# Patient Record
Sex: FEMALE | Race: WHITE | NOT HISPANIC OR LATINO | Employment: OTHER | URBAN - METROPOLITAN AREA
[De-identification: names, ages, dates, MRNs, and addresses within clinical notes are randomized per-mention and may not be internally consistent; named-entity substitution may affect disease eponyms.]

---

## 2021-07-21 ENCOUNTER — RECORDS - HEALTHEAST (OUTPATIENT)
Dept: ADMINISTRATIVE | Facility: CLINIC | Age: 66
End: 2021-07-21

## 2022-02-01 ENCOUNTER — TRANSFERRED RECORDS (OUTPATIENT)
Dept: MULTI SPECIALTY CLINIC | Facility: CLINIC | Age: 67
End: 2022-02-01
Payer: COMMERCIAL

## 2022-05-06 ENCOUNTER — THERAPY VISIT (OUTPATIENT)
Dept: PHYSICAL THERAPY | Facility: CLINIC | Age: 67
End: 2022-05-06
Payer: COMMERCIAL

## 2022-05-06 DIAGNOSIS — M25.551 BILATERAL HIP PAIN: ICD-10-CM

## 2022-05-06 DIAGNOSIS — M25.552 BILATERAL HIP PAIN: ICD-10-CM

## 2022-05-06 PROCEDURE — 97161 PT EVAL LOW COMPLEX 20 MIN: CPT | Mod: GP | Performed by: PHYSICAL THERAPIST

## 2022-05-06 PROCEDURE — 97110 THERAPEUTIC EXERCISES: CPT | Mod: GP | Performed by: PHYSICAL THERAPIST

## 2022-05-06 ASSESSMENT — ACTIVITIES OF DAILY LIVING (ADL)
HOS_ADL_SCORE(%): 60.29
HOW_WOULD_YOU_RATE_YOUR_CURRENT_LEVEL_OF_FUNCTION_DURING_YOUR_USUAL_ACTIVITIES_OF_DAILY_LIVING_FROM_0_TO_100_WITH_100_BEING_YOUR_LEVEL_OF_FUNCTION_PRIOR_TO_YOUR_HIP_PROBLEM_AND_0_BEING_THE_INABILITY_TO_PERFORM_ANY_OF_YOUR_USUAL_DAILY_ACTIVITIES?: 30
WALKING_APPROXIMATELY_10_MINUTES: SLIGHT DIFFICULTY
WALKING_15_MINUTES_OR_GREATER: MODERATE DIFFICULTY
GETTING_INTO_AND_OUT_OF_AN_AVERAGE_CAR: SLIGHT DIFFICULTY
PUTTING_ON_SOCKS_AND_SHOES: SLIGHT DIFFICULTY
WALKING_DOWN_STEEP_HILLS: SLIGHT DIFFICULTY
WALKING_UP_STEEP_HILLS: SLIGHT DIFFICULTY
SITTING_FOR_15_MINUTES: MODERATE DIFFICULTY
GETTING_INTO_AND_OUT_OF_A_BATHTUB: MODERATE DIFFICULTY
TWISTING/PIVOTING_ON_INVOLVED_LEG: EXTREME DIFFICULTY
LIGHT_TO_MODERATE_WORK: MODERATE DIFFICULTY
ROLLING_OVER_IN_BED: SLIGHT DIFFICULTY
HOS_ADL_HIGHEST_POTENTIAL_SCORE: 68
GOING_DOWN_1_FLIGHT_OF_STAIRS: SLIGHT DIFFICULTY
HOS_ADL_COUNT: 17
RECREATIONAL_ACTIVITIES: UNABLE TO DO
DEEP_SQUATTING: SLIGHT DIFFICULTY
HOS_ADL_ITEM_SCORE_TOTAL: 41
HEAVY_WORK: UNABLE TO DO
WALKING_INITIALLY: NO DIFFICULTY AT ALL
STEPPING_UP_AND_DOWN_CURBS: NO DIFFICULTY AT ALL
GOING_UP_1_FLIGHT_OF_STAIRS: SLIGHT DIFFICULTY
STANDING_FOR_15_MINUTES: MODERATE DIFFICULTY

## 2022-05-06 NOTE — PROGRESS NOTES
Physical Therapy Initial Evaluation  Subjective:  The history is provided by the patient. No  was used.   Patient Health History  Celso Cates being seen for Back and Hip Pain.     Problem began: 1/1/2022.   Problem occurred: It is a recurring problem   Pain is reported as 7/10 on pain scale.  General health as reported by patient is good.  Pertinent medical history includes: changes in bowel/bladder, pain at night/rest and unexplained weight loss.     Medical allergies: none.   Surgeries include:  Other. Other surgery history details: Pelvic reconstruction with hysterectomy; 2013.    Current medications:  Anti-depressants and sleep medication.    Current occupation is Professor.   Primary job tasks include:  Computer work and prolonged sitting.                  Therapist Generated HPI Evaluation  Problem details: Pt is self referred to therapy. She reports pain in right > left hip and right low back. History of some low back pain/SI joint pain about 5 years ago, had therapy at the time and was better. In January 2022 had increased pain after yoga class which really focused on end range stretching and holding in that position. She reports a hematoma on right lateral hip area that has been present for years, it doesn't hurt, but wonders if it affects anything regarding her hip pain. Has been told in the past that her SI joint is not stable, and that's what they worked on in therapy.  Normally is physically active, likes yoga, has kayak, likes to walk. With walking can go about 1 mile before onset of symptoms, but does not make it worse,  Not doing yoga now due to increased pain with it, but would like to do again, if able. She recently moved here from [place], so will be seeing primary doctor next week (in [facility]). .         Type of problem:  Lumbar and sacroiliac.    This is a chronic condition.  Condition occurred with:  Insidious onset.  Where condition occurred: during  recreation/sport.  Patient reports pain:  SI joint right and lumbar spine right.  Pain is described as aching and shooting and is intermittent.  Radiates to: right hip pain. Pain is the same all the time.  Since onset symptoms are unchanged.  Symptoms are exacerbated by certain positions, twisting, walking and standing  and relieved by rest and activity/movement.      Work activity restrictions: pt in process of moving and retiring.  Barriers include:  None as reported by patient.                        Objective:  System         Lumbar/SI Evaluation  ROM:      Strength: fair(+) strength core stabilizers  Lumbar Myotomes:    T12-L3 (Hip Flex):  Left: 5    Right: 5  L2-4 (Quads):  Left:  5    Right:  5  L4 (Ankle DF):  Left:  5      L5 (Great Toe Ext): Left: 5    Right: 5   S1 (Toe Raise):  Left: 5    Right: 5        Neural Tension/Mobility:  Lumbar:  Normal        Lumbar Palpation:      Tenderness present at Right: Piriformis and PSIS        SI joint/Sacrum:          Left negative at:    Forward bend standing  Right positive at:    Sacral thrust  Right negative at:  Forward bend standing  Sacral conclusion left:  Posterior inominate  Sacral conclusion right:  Anterior inominate                                  Hip Evaluation  HIP AROM:  AROM:   Left Hip:     Normal    Right Hip:                      Hip Strength:    Flexion:   Left: 5/5   Pain:  Right: 5/5   Pain:                    Extension:  Left: 5-/5  Pain:Right: 4+/5    Pain:    Abduction:  Left: 5-/5     Pain:Right: 5-/5    Pain:  Adduction:  Left: 5/5    Pain:Right: 5/5   Pain:  Internal Rotation:  Left: 5/5    Pain:Right: 5/5   Pain:  External Rotation:  Left: 5/5   Pain:  Right: 5-/5   Pain:  Knee Flexion:  Left: 5/5   Pain:Right: 5/5   Pain:  Knee Extension:  Left: 5/5   Pain:Right: 5/5    Pain:        Hip Special Testing:      Left hip negative for the following special tests:  Hiro; Fadir/Labrum or SLR  Right hip negative for the following special  tests:  Hiro; Fadir/Labrum or SLR    Hip Palpation:      Right hip tenderness present at:  PSIS and Iliac Crest  Functional Testing:          Quad:      Bilateral leg squat:  Mild loss of control and fermoral IR                    Ernestina Lumbar Evaluation    Posture:  Sitting: good  Standing: good  Lordosis: WNL  Lateral Shift: no      Movement Loss:  Flexion (Flex): nil  Extension (EXT): min  Side Glide R (SG R): nil  Side Glide L (SG L): nil  Test Movements:  FIS: During: no effect  After: no effect  Pretest Movements: 0/10   Repeat FIS: During: increases  After: no worse    EIS: During: no effect  After: no effect    Repeat EIS: During: no effect  After: no effect    COLT: During: no effect  After: no effect    Repeat COLT: During: produces  After: no worse    EIL: During: no effect  After: no effect    Repeat EIL: During: no effect  After: no effect            Principle of Treatment:          Other: some pain right PSIS and SI joint region with repeated flexion in standing and lying, but not increased pain after. end range tightness with extension, but no reproduction symptoms.                                       ROS    Assessment/Plan:    Patient is a 66 year old female with lumbar, sacral and both sides hip complaints.    Patient has the following significant findings with corresponding treatment plan.                Diagnosis 1:  Hip pain  Pain -  hot/cold therapy, manual therapy, directional preference exercise and home program  Decreased ROM/flexibility - manual therapy, therapeutic exercise and home program  Decreased joint mobility - manual therapy, therapeutic exercise and home program  Decreased strength - therapeutic exercise, therapeutic activities and home program    Therapy Evaluation Codes:   1) Clinical presentation characteristics are:   Stable/Uncomplicated.  2) Decision-Making    Low complexity using standardized patient assessment instrument and/or measureable assessment of functional  outcome.  Cumulative Therapy Evaluation is: Low complexity.    Previous and current functional limitations:  (See Goal Flow Sheet for this information)    Short term and Long term goals: (See Goal Flow Sheet for this information)     Communication ability:  Patient appears to be able to clearly communicate and understand verbal and written communication and follow directions correctly.  Treatment Explanation - The following has been discussed with the patient:   RX ordered/plan of care  Anticipated outcomes  Possible risks and side effects  This patient would benefit from PT intervention to resume normal activities.   Rehab potential is good.    Frequency:  1 X week, once daily  Duration:  for 8 weeks  Discharge Plan:  Achieve all LTG.  Independent in home treatment program.  Reach maximal therapeutic benefit.    Please refer to the daily flowsheet for treatment today, total treatment time and time spent performing 1:1 timed codes.

## 2022-05-06 NOTE — PROGRESS NOTES
Celso is a 66 year old who is being evaluated via a billable video visit.      How would you like to obtain your AVS? MyChart  If the video visit is dropped, the invitation should be resent by: Text to cell phone: [phone]  Will anyone else be joining your video visit? No      Video Start Time: 4:24 PM    Assessment & Plan   66 year old female, moved from  [place] 2 weeks ago  Appointment today to establish as new patient  And also to discuss one sided headache and concerns about sinus infection.  Nonintractable migraine, unspecified migraine type  DDX include cluster.mi headache, sinus headache, benign tumer, infection    Clinically the cause of one sided headache , behind left eye with some sinus congestion/ rhinorrhea on the same size - - seem consistent with cluster vs migraine type cluster headache    . Advised adequate hydration, and sleep  Take as needed  imitrex   - SUMAtriptan (IMITREX) 50 MG tablet; Take 1 tablet (50 mg) by mouth at onset of headache for migraine May repeat in 2 hours. Max 4 tablets/24 hours.  - melatonin 3 MG tablet; actually 1 mg bedtime to help prevent frquet headache Take 1 tablet (3 mg) by mouth nightly as needed for sleep  Potential medication side effects were discussed with the patient; let me know if any occur.      Make headache diary  Consider prophylactic medications like verapamil in 4 weeks follow up    Follow up earlier as needed  If questions about frequent headache or new concerns    Sinus headache  She is requesting ENT referral and is provider  - Otolaryngology Referral; Future    Encounter for screening mammogram for malignant neoplasm of breast  Advised to complete mammogram  - *MA Screening Digital Bilateral; Future    Colon cancer screening  Colonoscopy Up to date - [facility] [place]-  Feb 2022- repeat due in 10 yrs    Known history of anxiety and depression  - considered this problem when making today's plans  - no interventions today       -followed by  specialist.      Prescription drug management  44 minutes spent on the date of the encounter doing chart review, history and exam, documentation and further activities per the note      Return in about 4 weeks (around 2022) for concerns,unresolved, routine physical.    Spring Fletcher MD  [facility]DALTON Houston is a 66 year old who presents for the following health issues     History of Present Illness       Back Pain:  She presents for follow up of back pain. Patient's back pain is a recurring problem.  Location of back pain:  Right lower back, right middle of back, left middle of back and right hip  Description of back pain: burning and gnawing  Back pain spreads: right buttocks    Since patient first noticed back pain, pain is: always present, but gets better and worse  Does back pain interfere with her job:  Yes      She eats 4 or more servings of fruits and vegetables daily.She consumes 0 sweetened beverage(s) daily.She exercises with enough effort to increase her heart rate 20 to 29 minutes per day.  She exercises with enough effort to increase her heart rate 7 days per week.   She is taking medications regularly.   started PHYSICAL THERAPY and feels confident iwill keep improving        Moved from   [place] to [facility]  Hoping to get on medicare & will have appointment comming up    Left eye pain-since 2021  tootie after long zoom meeting.  Seen ophthalmology- no eye problem & is advised to see ENT- hoping to get referral    Have sinus headache- april  Left sinus congestion  Can not take allergy medications - respond badly  Even saline drops     Also left sided headache - behind left eye ball- feels infection in the sinus at that's time- headache , moderate last up to 2 hrs, improves on own.    Anxiety - under care of   remeron is helped  Retiring , dad , COVID  Also seeing a thera[pist      Review of Systems  knwn history of anxiety- and see  provider.  Orally  Remeron  lunesta bedtime to help sleep.  Moved to [facility] only 2 weeks ago and wants to establish new care as wel.    Constitutional, HEENT, cardiovascular, pulmonary, GI, , musculoskeletal, neuro, skin, endocrine and psych systems are negative, except as otherwise noted.      Objective           Vitals:  No vitals were obtained today due to virtual visit.    Physical Exam   GENERAL: Healthy, alert and no distress  EYES: Eyes grossly normal to inspection.  No discharge or erythema, or obvious scleral/conjunctival abnormalities.  RESP: No audible wheeze, cough, or visible cyanosis.  No visible retractions or increased work of breathing.    SKIN: Visible skin clear. No significant rash, abnormal pigmentation or lesions.  NEURO: Cranial nerves grossly intact.  Mentation and speech appropriate for age.  PSYCH: Mentation appears normal, affect normal/bright, judgement and insight intact, normal speech and appearance well-groomed.                Video-Visit Details    Type of service:  Video Visit    Video End Time:4:46 PM    Originating Location (pt. Location): Home    Distant Location (provider location):  [facility]     Platform used for Video Visit: [employer]

## 2022-05-09 ENCOUNTER — VIRTUAL VISIT (OUTPATIENT)
Dept: FAMILY MEDICINE | Facility: CLINIC | Age: 67
End: 2022-05-09
Payer: COMMERCIAL

## 2022-05-09 DIAGNOSIS — Z12.11 COLON CANCER SCREENING: ICD-10-CM

## 2022-05-09 DIAGNOSIS — Z12.31 ENCOUNTER FOR SCREENING MAMMOGRAM FOR MALIGNANT NEOPLASM OF BREAST: ICD-10-CM

## 2022-05-09 DIAGNOSIS — G43.009 NONINTRACTABLE MIGRAINE, UNSPECIFIED MIGRAINE TYPE: Primary | ICD-10-CM

## 2022-05-09 DIAGNOSIS — F41.9 ANXIETY DISORDER, UNSPECIFIED TYPE: ICD-10-CM

## 2022-05-09 DIAGNOSIS — R51.9 SINUS HEADACHE: ICD-10-CM

## 2022-05-09 PROCEDURE — 99203 OFFICE O/P NEW LOW 30 MIN: CPT | Mod: 95 | Performed by: FAMILY MEDICINE

## 2022-05-09 RX ORDER — SUMATRIPTAN 50 MG/1
50 TABLET, FILM COATED ORAL
Qty: 30 TABLET | Refills: 3 | Status: SHIPPED | OUTPATIENT
Start: 2022-05-09 | End: 2022-06-09

## 2022-05-09 RX ORDER — LANOLIN ALCOHOL/MO/W.PET/CERES
3 CREAM (GRAM) TOPICAL
Qty: 30 TABLET | Refills: 0 | COMMUNITY
Start: 2022-05-09 | End: 2022-06-09

## 2022-05-12 NOTE — TELEPHONE ENCOUNTER
FUTURE VISIT INFORMATION      FUTURE VISIT INFORMATION:    Date: 6/24/22    Time: 3PM    Location: [facility]  REFERRAL INFORMATION:    Referring provider:  Spring Fletcher MD    Referring providers clinic:  [facility]    Reason for visit/diagnosis  Per Pt, dx sinus headache, referral from PCP, recs in epic    RECORDS REQUESTED FROM:       Clinic name Comments Records Status Imaging Status   [facility] 5/9/22 note and referral from Spring Fletcher MD Epic

## 2022-05-17 ENCOUNTER — THERAPY VISIT (OUTPATIENT)
Dept: PHYSICAL THERAPY | Facility: CLINIC | Age: 67
End: 2022-05-17
Payer: COMMERCIAL

## 2022-05-17 DIAGNOSIS — M25.552 BILATERAL HIP PAIN: Primary | ICD-10-CM

## 2022-05-17 DIAGNOSIS — M25.551 BILATERAL HIP PAIN: Primary | ICD-10-CM

## 2022-05-17 PROCEDURE — 97140 MANUAL THERAPY 1/> REGIONS: CPT | Mod: GP | Performed by: PHYSICAL THERAPIST

## 2022-05-17 PROCEDURE — 97110 THERAPEUTIC EXERCISES: CPT | Mod: GP | Performed by: PHYSICAL THERAPIST

## 2022-05-18 NOTE — TELEPHONE ENCOUNTER
FUTURE VISIT INFORMATION      FUTURE VISIT INFORMATION:    Date: 6/14/22    Time: 10:00am    [place] [facility]  REFERRAL INFORMATION:    Referring providers clinic:  [facility]    Reason for visit/diagnosis  Stye Issues    RECORDS REQUESTED FROM:       No recs to collect per pt

## 2022-05-19 ENCOUNTER — MEDICAL CORRESPONDENCE (OUTPATIENT)
Dept: ULTRASOUND IMAGING | Facility: CLINIC | Age: 67
End: 2022-05-19
Payer: COMMERCIAL

## 2022-05-26 ENCOUNTER — THERAPY VISIT (OUTPATIENT)
Dept: PHYSICAL THERAPY | Facility: CLINIC | Age: 67
End: 2022-05-26
Payer: COMMERCIAL

## 2022-05-26 DIAGNOSIS — M25.551 BILATERAL HIP PAIN: Primary | ICD-10-CM

## 2022-05-26 DIAGNOSIS — M25.552 BILATERAL HIP PAIN: Primary | ICD-10-CM

## 2022-05-26 PROCEDURE — 97530 THERAPEUTIC ACTIVITIES: CPT | Mod: GP | Performed by: PHYSICAL THERAPIST

## 2022-05-26 PROCEDURE — 97140 MANUAL THERAPY 1/> REGIONS: CPT | Mod: GP | Performed by: PHYSICAL THERAPIST

## 2022-05-31 ENCOUNTER — ANCILLARY PROCEDURE (OUTPATIENT)
Dept: MAMMOGRAPHY | Facility: CLINIC | Age: 67
End: 2022-05-31
Attending: FAMILY MEDICINE
Payer: COMMERCIAL

## 2022-05-31 ENCOUNTER — THERAPY VISIT (OUTPATIENT)
Dept: PHYSICAL THERAPY | Facility: CLINIC | Age: 67
End: 2022-05-31
Payer: COMMERCIAL

## 2022-05-31 DIAGNOSIS — M25.551 BILATERAL HIP PAIN: Primary | ICD-10-CM

## 2022-05-31 DIAGNOSIS — Z12.31 ENCOUNTER FOR SCREENING MAMMOGRAM FOR MALIGNANT NEOPLASM OF BREAST: ICD-10-CM

## 2022-05-31 DIAGNOSIS — M25.552 BILATERAL HIP PAIN: Primary | ICD-10-CM

## 2022-05-31 PROCEDURE — 97140 MANUAL THERAPY 1/> REGIONS: CPT | Mod: GP | Performed by: PHYSICAL THERAPIST

## 2022-05-31 PROCEDURE — 97110 THERAPEUTIC EXERCISES: CPT | Mod: GP | Performed by: PHYSICAL THERAPIST

## 2022-05-31 PROCEDURE — 77063 BREAST TOMOSYNTHESIS BI: CPT

## 2022-05-31 PROCEDURE — 77067 SCR MAMMO BI INCL CAD: CPT

## 2022-06-07 ENCOUNTER — TELEPHONE (OUTPATIENT)
Dept: FAMILY MEDICINE | Facility: CLINIC | Age: 67
End: 2022-06-07

## 2022-06-07 ENCOUNTER — THERAPY VISIT (OUTPATIENT)
Dept: PHYSICAL THERAPY | Facility: CLINIC | Age: 67
End: 2022-06-07
Payer: COMMERCIAL

## 2022-06-07 DIAGNOSIS — M25.551 BILATERAL HIP PAIN: Primary | ICD-10-CM

## 2022-06-07 DIAGNOSIS — M25.552 BILATERAL HIP PAIN: Primary | ICD-10-CM

## 2022-06-07 PROCEDURE — 97110 THERAPEUTIC EXERCISES: CPT | Mod: GP

## 2022-06-07 NOTE — TELEPHONE ENCOUNTER
A.S    Pt seeing a new provider for PT but needs a referral since this provider is not certified to self referred patients.    T'd up order.    FYI: Pt does have an appt to see you today.     Please approve if appropriate  Fatimah Wyman RN

## 2022-06-07 NOTE — TELEPHONE ENCOUNTER
1. Bilateral hip pain  - Physical Therapy Referral; Future    Patient missed appointment with me today.  Referral is signed. Thanks n             Self

## 2022-06-08 NOTE — TELEPHONE ENCOUNTER
Noted, PT clinic said they didn't need a call back. Will look for referral in Epic.    Thank you,  Fatimah Wyman RN  [place]

## 2022-06-09 ENCOUNTER — OFFICE VISIT (OUTPATIENT)
Dept: URGENT CARE | Facility: URGENT CARE | Age: 67
End: 2022-06-09
Payer: COMMERCIAL

## 2022-06-09 ENCOUNTER — ANCILLARY PROCEDURE (OUTPATIENT)
Dept: ULTRASOUND IMAGING | Facility: CLINIC | Age: 67
End: 2022-06-09
Attending: INTERNAL MEDICINE
Payer: COMMERCIAL

## 2022-06-09 VITALS
HEART RATE: 81 BPM | OXYGEN SATURATION: 96 % | DIASTOLIC BLOOD PRESSURE: 76 MMHG | SYSTOLIC BLOOD PRESSURE: 135 MMHG | TEMPERATURE: 97.3 F | HEIGHT: 63 IN | WEIGHT: 115 LBS | RESPIRATION RATE: 16 BRPM | BODY MASS INDEX: 20.38 KG/M2

## 2022-06-09 DIAGNOSIS — M77.8 THUMB TENDONITIS: Primary | ICD-10-CM

## 2022-06-09 DIAGNOSIS — R10.13 DYSPEPSIA: ICD-10-CM

## 2022-06-09 PROCEDURE — 76700 US EXAM ABDOM COMPLETE: CPT | Mod: GC | Performed by: STUDENT IN AN ORGANIZED HEALTH CARE EDUCATION/TRAINING PROGRAM

## 2022-06-09 PROCEDURE — 99203 OFFICE O/P NEW LOW 30 MIN: CPT | Performed by: PHYSICIAN ASSISTANT

## 2022-06-09 ASSESSMENT — ENCOUNTER SYMPTOMS
CONSTITUTIONAL NEGATIVE: 1
MYALGIAS: 1
NEUROLOGICAL NEGATIVE: 1
ARTHRALGIAS: 1
PSYCHIATRIC NEGATIVE: 1

## 2022-06-09 NOTE — PROGRESS NOTES
"Assessment & Plan     Thumb tendonitis  Tylenol up to 1000 mg three times a day as needed, Lidocaine patch/cream/gel, Hot soaks with epsom salt. She will buddy tape her fingers at this time and discussed different splints to use.     Return in about 1 week (around 6/16/2022), or if symptoms worsen or fail to improve.    Subjective     Celso is a 66 year old female who presents to clinic today with for the following health issues:  Chief Complaint   Patient presents with     Urgent Care     Trauma     Rt thumb pain since yesterday. Pt being using thumb to message hip.     Celso presents with right thumb pain x 2 days. She reports she uses her thumb as a trigger point to massage her SI joint. She denies numbness, tingling or weakness. Pain is in the thenar eminence. She has not tried any medication. She has tried to wrap her thumb.           Review of Systems   Constitutional: Negative.    Musculoskeletal: Positive for arthralgias and myalgias.   Skin: Negative.    Neurological: Negative.    Psychiatric/Behavioral: Negative.            Objective    /76   Pulse 81   Temp 97.3  F (36.3  C) (Temporal)   Resp 16   Ht 1.6 m (5' 3\")   Wt 52.2 kg (115 lb)   SpO2 96%   BMI 20.37 kg/m    Physical Exam  Constitutional:       Appearance: Normal appearance.   HENT:      Head: Normocephalic and atraumatic.   Musculoskeletal:         General: No swelling. Normal range of motion.      Cervical back: Normal range of motion and neck supple.      Comments: Tenderness of right thenar eminence.   (-) Finklestein's   Skin:     General: Skin is warm and dry.   Neurological:      Mental Status: She is alert.              Ian Chun PA-C    "

## 2022-06-09 NOTE — PATIENT INSTRUCTIONS
https://www.amazon.com/Reversible-Stabilizer-BlackBerry-Tendonitis-Lightweight/dp/P67KNCMJOQ/ref=asc_df_B07SWTLTSD/?tag=hyprod-20&linkCode=df0&fszrji=[email or web address]&hvpos=&hvnetw=g&ctygmi=[email or web address]&hvpone=&hvptwo=&hvqmt=&hvdev=c&hvdvcmdl=&hvlocint=&yvlyywck=[id number]&hvtargid=[id number]&psc=1&tag=&ref=&hogzrjm=[id number]&hvpone=&hvptwo=&uelaqs=[id number]&hvpos=&hvnetw=g&dqjdon=[id number]&hvqmt=&hvdev=c&hvdvcmdl=&hvlocint=&fctutnyv=[id number]&hvtargid=[id number]    Tylenol up to 1000 mg three times a day as needed, Lidocaine patch/cream/gel, Hot soaks with epsom salt

## 2022-06-11 ENCOUNTER — HEALTH MAINTENANCE LETTER (OUTPATIENT)
Age: 67
End: 2022-06-11

## 2022-06-13 NOTE — PROGRESS NOTES
[facility]    OUTPATIENT Physical Therapy ORTHOPEDIC EVALUATION  PLAN OF TREATMENT FOR OUTPATIENT REHABILITATION  (COMPLETE FOR INITIAL CLAIMS ONLY)  Patient's Last Name, First Name, M.I.  YOB: 1955  Celso Cates       Provider s Name:  [facility]   Medical Record No.  [id number]   Start of Care Date:  05/06/22   Onset Date:   01/01/22   Type:     _X__PT   ___OT Medical Diagnosis:    Encounter Diagnosis   Name Primary?    Bilateral hip pain         Treatment Diagnosis:  bilateral hip pain        Goals:     05/06/22 0500   Body Part   Goals listed below are for Hip pain   Goal #1   Goal #1 ambulation   Previous Functional Level No restrictions   Current Functional Level Minutes patient can walk   Performance Level 10 minutes; pain 7/10   STG Target Performance Minutes patient will be able to walk   Performance Level 15 min 3/10 pain   Rationale for safe household ambulation;for safe outdoor household ambulation;for safe community ambulation;to maintain proper body mechanics/posture while ambulating to avoid additional compensatory injury due to improper gait mechanics;to promote a healthy and active lifestyle   Due Date 06/03/22    LTG Target Performance Minutes patient will be able to  walk   Performance Level 30 minutes 1/10 pain   Rationale for safe household ambulation;for safe outdoor household ambulation;for safe community ambulation;to maintain proper body mechanics/posture while ambulating to avoid additional compensatory injury due to improper gait mechanics;to promote a healthy and active lifestyle   Due Date 07/01/22       Therapy Frequency:  1x/week  Predicted Duration of Therapy Intervention:  8 weeks    Suzan Bernstein, PT                 I CERTIFY THE NEED FOR THESE SERVICES FURNISHED UNDER        THIS PLAN OF TREATMENT AND WHILE UNDER MY CARE      (Physician attestation of this document indicates review and certification of the therapy plan).                     Certification Date From:  05/06/22   Certification Date To:  07/01/22    Referring Provider:  F=Referred Self    Initial Assessment        See Epic Evaluation SOC Date: 05/06/22

## 2022-06-14 ENCOUNTER — TELEPHONE (OUTPATIENT)
Dept: FAMILY MEDICINE | Facility: CLINIC | Age: 67
End: 2022-06-14

## 2022-06-14 ENCOUNTER — OFFICE VISIT (OUTPATIENT)
Dept: OPHTHALMOLOGY | Facility: CLINIC | Age: 67
End: 2022-06-14
Payer: COMMERCIAL

## 2022-06-14 ENCOUNTER — PRE VISIT (OUTPATIENT)
Dept: OPHTHALMOLOGY | Facility: CLINIC | Age: 67
End: 2022-06-14
Payer: COMMERCIAL

## 2022-06-14 DIAGNOSIS — H00.14 CHALAZION OF LEFT UPPER EYELID: Primary | ICD-10-CM

## 2022-06-14 DIAGNOSIS — H02.889 MEIBOMIAN GLAND DYSFUNCTION: ICD-10-CM

## 2022-06-14 DIAGNOSIS — H01.02A SQUAMOUS BLEPHARITIS OF UPPER AND LOWER EYELIDS OF BOTH EYES: ICD-10-CM

## 2022-06-14 DIAGNOSIS — H01.02B SQUAMOUS BLEPHARITIS OF UPPER AND LOWER EYELIDS OF BOTH EYES: ICD-10-CM

## 2022-06-14 PROCEDURE — 99203 OFFICE O/P NEW LOW 30 MIN: CPT | Mod: GC | Performed by: OPHTHALMOLOGY

## 2022-06-14 ASSESSMENT — EXTERNAL EXAM - LEFT EYE: OS_EXAM: NORMAL

## 2022-06-14 ASSESSMENT — EXTERNAL EXAM - RIGHT EYE: OD_EXAM: NORMAL

## 2022-06-14 ASSESSMENT — SLIT LAMP EXAM - LIDS: COMMENTS: MGD, BLEPHARITIS

## 2022-06-14 ASSESSMENT — TONOMETRY
OS_IOP_MMHG: 19
OD_IOP_MMHG: 19
IOP_METHOD: ICARE

## 2022-06-14 ASSESSMENT — CONF VISUAL FIELD
OS_NORMAL: 1
OD_NORMAL: 1
METHOD: COUNTING FINGERS

## 2022-06-14 ASSESSMENT — VISUAL ACUITY
OS_CC: 20/20
METHOD: SNELLEN - LINEAR
OD_CC+: -2
OD_CC: 20/20
CORRECTION_TYPE: GLASSES
OS_CC+: -2

## 2022-06-14 NOTE — TELEPHONE ENCOUNTER
Reason for Call:  Form, our goal is to have forms completed with 72 hours, however, some forms may require a visit or additional information.    Type of letter, form or note:    Plan of treatment for outpatient PT rehabilitation    Therapy frequency: 1x/week  Predicted duration of therapy intervention: 8 weeks    Certification date from: 05/06/22  Certification date to: 07/01/22      Who is the form from?:   [facility]     Where did the form come from: form was faxed in    What clinic location was the form placed at?:   [facility]    Where the form was placed:   Dr. Fletcher's box    What number is listed as a contact on the form?:   Fax: [phone]       Additional comments: none    Call taken on 6/14/2022 at 10:34 AM by Dione Abarca

## 2022-06-14 NOTE — NURSING NOTE
Chief Complaints and History of Present Illnesses   Patient presents with     Eyelid Cyst Evaluation     Chief Complaint(s) and History of Present Illness(es)     Eyelid Cyst Evaluation     Laterality: left upper lid    Associated symptoms: Negative for blurred vision, discharge, mattering, tearing and eye pain    Treatments tried: artificial tears and warm compresses              Comments     Patient states stye that was present for about 1 months MONTSE. Patient states maybe some sensitivity to erythromycin.  Patient states the stye was drained. Patient states the area is still itchy sometimes and would like to know how to avoid styes in the future. Patient still performs warm compresses each eye daily. Patient states dry eyes each eye.       Eduarda Martinez, EZEQUIEL June 14, 2022 9:52 AM

## 2022-06-14 NOTE — PROGRESS NOTES
[facility] Progress Note    Patient: Celso Cates MRN# [id number]   YOB: 1955 Age: 66 year old   Date of Visit: Jun 14, 2022    CC: Patient presents with:  Eyelid Cyst Evaluation           HPI:     Celso Cates is a 66 year old female with POHx of left retinal tear s/p retinopexy in [place], chalazia for last 1.5 years with 1 s/p I&D in [place]. Here for opinion on how to prevent recurrence.     Interval Jun 14, 2022:  Chief Complaint(s) and History of Present Illness(es)     Eyelid Cyst Evaluation     Laterality: left upper lid    Associated symptoms: Negative for blurred vision, discharge, mattering,   tearing and eye pain    Treatments tried: artificial tears and warm compresses              Comments     Patient states stye that was present for about 1 months MONTSE. Patient   states maybe some sensitivity to erythromycin.  Patient states the stye   was drained. Patient states the area is still itchy sometimes and would   like to know how to avoid styes in the future. Patient still performs warm   compresses each eye daily. Patient states dry eyes each eye.       EZEQUIEL Moore June 14, 2022 9:52 AM                 Assessment & Plan:   Celso Cates is a 66 year old female with the following diagnoses:   Encounter Diagnoses   Name Primary?     Chalazion of left upper eyelid Yes     Squamous blepharitis of upper and lower eyelids of both eyes      Meibomian gland dysfunction    - 1x chalazion s/p I&D in [place]  - No recurrence; pt just wants opinion on how to prevent recurrence  Plan  - Emphasized WCs, LH  - Can trial doxycycline as she is on ocular rosacea spectrum and treating blepharitis will help. She prefers not to do that for now.          Patient disposition: Return for next available complete eye exam with optometry.     My privilege to be part of your care,  Donovan Lipscomb MD, MSc  Ophthalmology PGY-2 resident physician  Pager: [phone]  Agree with  above, mild residual blepharitis. Lid hygiene discussed.  History of retinal tear lasered in [place].  Would like to establish primary eye care here.      Attending Physician Attestation: Complete documentation of historical and exam elements from today's encounter can be found in the full encounter summary report (not reduplicated in this progress note). I personally obtained the chief complaint(s) and history of present illness. I confirmed and edited as necessary the review of systems, past medical/surgical history, family history, social history, and examination findings as documented by others; and I examined the patient myself. I personally reviewed the relevant tests, images, and reports as documented above. I formulated and edited as necessary the assessment and plan and discussed the findings and management plan with the patient.  -Gene Talbert MD

## 2022-06-23 NOTE — PROGRESS NOTES
"SUBJECTIVE:   Celso Cates is a 66 year old female who presents for Preventive Visit.      Patient has been advised of split billing requirements and indicates understanding: Yes  Are you in the first 12 months of your Medicare coverage?  Yes,  Visual Acuity:  Right Eye: 20/40   Left Eye: 20/25  Both Eyes: 20/25    Healthy Habits:     In general, how would you rate your overall health?  Fair    Frequency of exercise:  6-7 days/week    Duration of exercise:  15-30 minutes    Do you usually eat at least 4 servings of fruit and vegetables a day, include whole grains    & fiber and avoid regularly eating high fat or \"junk\" foods?  Yes    Taking medications regularly:  Yes    Medication side effects:  Not applicable    Ability to successfully perform activities of daily living:  Housework requires assistance    Home Safety:  Lack of grab bars in the bathroom    Hearing Impairment:  No hearing concerns    In the past 6 months, have you been bothered by leaking of urine?  No    In general, how would you rate your overall mental or emotional health?  Fair      PHQ-2 Total Score: 2    Additional concerns today:  No  Digestive problems start since March 2022  She suffers from  Early  early satiety , worried about wt loss and on & Off abdomen pain    [facility]- on going treatment .Plans to complete endoscopy 6/28/22  She is worried about nutrition  & weight loss .  Wt Readings from Last 5 Encounters:   06/27/22 50 kg (110 lb 4.8 oz)   06/09/22 52.2 kg (115 lb)   05/07/15 57.1 kg (125 lb 12.8 oz)   04/15/14 55.5 kg (122 lb 6.4 oz)   09/26/13 55.9 kg (123 lb 3.2 oz)       Has seen eye specialist  No concerns with migraine or headache     Has right  handed, works on computer , with right thumb  aching on and off.  Has not needed pain medications  Wondering about PHYSICAL THERAPY       Strong personal and family history of anxiety. krystina has anxiety.  Is  Under care of Dr Regan Covington for 20 yrs      She " has appointment today with dietician- at [facility]- needs referral    Do you feel safe in your environment? Yes    Have you ever done Advance Care Planning? (For example, a Health Directive, POLST, or a discussion with a medical provider or your loved ones about your wishes): No, advance care planning information given to patient to review.  Patient declined advance care planning discussion at this time.       Fall risk  Fallen 2 or more times in the past year?: No  Any fall with injury in the past year?: No    Cognitive Screening   1) Repeat 3 items (Leader, Season, Table)    2) Clock draw: ABNORMAL hands wrong and missing number  3) 3 item recall: Recalls 3 objects  Results: Abnormal clock, all 3 words recalled    Mini-CogTM Copyright [employer]. Licensed by the author for use in [facility] [employer]; reprinted with permission (matthew@[facility]). All rights reserved.      Do you have sleep apnea, excessive snoring or daytime drowsiness?: no    Reviewed and updated as needed this visit by clinical staff   Tobacco  Allergies  Meds  Problems  Med Hx  Surg Hx  Fam Hx  Soc   Hx          Reviewed and updated as needed this visit by Provider   Tobacco  Allergies  Meds  Problems  Med Hx  Surg Hx  Fam Hx           Social History     Tobacco Use     Smoking status: Never Smoker     Smokeless tobacco: Never Used   Substance Use Topics     Alcohol use: Yes     Alcohol/week: 2.5 - 4.2 standard drinks     Types: 3 - 5 drink(s) per week     If you drink alcohol do you typically have >3 drinks per day or >7 drinks per week? No    No flowsheet data found.            Current providers sharing in care for this patient include:   Patient Care Team:  Spring Fletcher MD as PCP - General (Family Medicine)  Spring Fletcher MD as Assigned PCP  Gene Talbert MD as MD (Ophthalmology)  Gene Talbert MD as Assigned Surgical Provider    The following health maintenance items are reviewed in Epic and correct as of  today:  Health Maintenance Due   Topic Date Due     DEXA  Never done     HIV SCREENING  Never done     HEPATITIS C SCREENING  Never done     ZOSTER IMMUNIZATION (1 of 2) Never done     LIPID  04/06/2015     BP Readings from Last 3 Encounters:   06/27/22 118/67   06/09/22 135/76   05/07/15 90/60    Wt Readings from Last 3 Encounters:   06/27/22 50 kg (110 lb 4.8 oz)   06/09/22 52.2 kg (115 lb)   05/07/15 57.1 kg (125 lb 12.8 oz)                    Any new diagnosis of family breast, ovarian, or bowel cancer?     FHS-7:   Breast CA Risk Assessment (FHS-7) 5/31/2022 6/24/2022   Did any of your first-degree relatives have breast or ovarian cancer? Yes Yes   Did any of your relatives have bilateral breast cancer? No No   Did any man in your family have breast cancer? No No   Did any woman in your family have breast and ovarian cancer? No No   Did any woman in your family have breast cancer before age 50 y? No No   Do you have 2 or more relatives with breast and/or ovarian cancer? Yes No   Do you have 2 or more relatives with breast and/or bowel cancer? Yes No     click delete button to remove this line now    Pertinent mammograms are reviewed under the imaging tab.    Review of Systems   Constitutional: Negative for chills and fever.   HENT: Negative for congestion, ear pain, hearing loss and sore throat.    Eyes: Negative for pain and visual disturbance.   Respiratory: Negative for cough and shortness of breath.    Cardiovascular: Negative for chest pain, palpitations and peripheral edema.   Gastrointestinal: Positive for abdominal pain, constipation and heartburn. Negative for diarrhea, hematochezia and nausea.   Breasts:  Negative for tenderness, breast mass and discharge.   Genitourinary: Positive for pelvic pain and urgency. Negative for dysuria, frequency, genital sores, hematuria, vaginal bleeding and vaginal discharge.   Musculoskeletal: Positive for arthralgias and myalgias. Negative for joint swelling.   Skin:  "Positive for rash.   Neurological: Negative for dizziness, weakness, headaches and paresthesias.   Psychiatric/Behavioral: Positive for mood changes. The patient is nervous/anxious.      Constitutional, HEENT, cardiovascular, pulmonary, GI, , musculoskeletal, neuro, skin, endocrine and psych systems are negative, except as otherwise noted.    OBJECTIVE:   /67   Pulse 80   Temp 97.5  F (36.4  C) (Temporal)   Resp 16   Ht 1.595 m (5' 2.8\")   Wt 50 kg (110 lb 4.8 oz)   LMP  (LMP Unknown)   SpO2 97%   BMI 19.67 kg/m   Estimated body mass index is 19.67 kg/m  as calculated from the following:    Height as of this encounter: 1.595 m (5' 2.8\").    Weight as of this encounter: 50 kg (110 lb 4.8 oz).  Physical Exam  GENERAL: healthy, alert and no distress  EYES: Eyes grossly normal to inspection, PERRL and conjunctivae and sclerae normal  HENT: ear canals and TM's normal, nose and mouth without ulcers or lesions  NECK: no adenopathy, no asymmetry, masses, or scars and thyroid normal to palpation  RESP: lungs clear to auscultation - no rales, rhonchi or wheezes  BREAST: normal without masses, tenderness or nipple discharge and no palpable axillary masses or adenopathy  CV: regular rate and rhythm, normal S1 S2, no S3 or S4, no murmur, click or rub, no peripheral edema and peripheral pulses strong  ABDOMEN: soft, nontender, no hepatosplenomegaly, no masses and bowel sounds normal  Musculoskeletal Exam:Right Wrist: Local tenderness over the distal portion of the radial styloid, adjacent to the abductor pollicis longus tendon.Pain is aggravated by resisting thumb extension and abduction isometrically. Pain is also aggravated by passively stretching the thumb tendons over the radial styloid in thumb flexion,fingers folded over thumb  (the Finkelstein maneuver)  SKIN: no suspicious lesions or rashes  NEURO: Normal strength and tone, mentation intact and speech normal  PSYCH: mentation appears normal, affect " normal/bright    Diagnostic Test Results:  Labs reviewed in Epic  none     ASSESSMENT / PLAN:   Celso was seen today for physical.    Diagnoses and all orders for this visit:    Routine general medical examination at a health care facility  Comments:  Colonoscopy completed & clear 2/2022- next in 10 yrs 2032. Completed at [facility].  Mammogram: anual & completed 5/2022    Following blood test discussed and she will make a separate lab only appointment.  Screening for HIV (human immunodeficiency virus)  -     HIV Antigen Antibody Combo; Future    Need for hepatitis C screening test  -     Hepatitis C Screen Reflex to HCV RNA Quant and Genotype; Future    Screening for hyperlipidemia  -     Lipid panel reflex to direct LDL Fasting; Future    Thumb pain, right  -Mild tendinitis well localized on the right MCP.  Wrist brace, most time of the day.  Okay to see hand therapist for symptomatic treatment.      Occupational Therapy Referral; Future    Generalized anxiety disorder  Comments:  under care of Merlin Covington at Psychiatry  We addressed ongoing anxiety, could be the cause of gastrointestinal discomfort, dyspepsia weight loss.  Anxiety could have been aggravated by above.  I have recommended to review her ongoing anxiety treatment in detail with her current therapist and psychiatrist.    Asymptomatic menopausal state   -     DEXA HIP/PELVIS/SPINE - Future; Future    Dyspepsia and disorder of function of stomach  Comments:  Under care of [facility]  has Endoscopy 6/28/22  Constipation, unspecified constipation type  Comments:  Miralax daily.  Under care of CAD[facility]    Vaginal dryness  -She has been using ERT, without she complains of vaginal dryness.  Side effect of ERT discussed and refill given.   Estradiol (ESTRACE VAGINAL) 0.1 MG/GM vaginal cream; Place 1 g vaginally three times a week    Weight loss, multifactorial.  She is a courage to complete her gastroenterology work-up.  She has endoscopy tomorrow.  She is  "advised to review use of PPIs, rule out H. pylori, consider colonoscopy as well though she reports it was well within normal limits in past 1 year.  I have also discussed if weight loss continues despite negative GI work-up she is to follow-up regarding that in next 4 weeks or earlier if more concerns.  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  Other orders  -     REVIEW OF HEALTH MAINTENANCE PROTOCOL ORDERS  -Shingles vaccine discussed and patient declined, encouraged to get it completed at pharmacy.     ZOSTER VACCINE RECOMBINANT ADJUVANTED (SHINGRIX)        Patient has been advised of split billing requirements and indicates understanding: Yes    COUNSELING:  Reviewed preventive health counseling, as reflected in patient instructions       Regular exercise       Healthy diet/nutrition       Vision screening       Hearing screening       Dental care       Bladder control       Fall risk prevention    Estimated body mass index is 19.67 kg/m  as calculated from the following:    Height as of this encounter: 1.595 m (5' 2.8\").    Weight as of this encounter: 50 kg (110 lb 4.8 oz).        She reports that she has never smoked. She has never used smokeless tobacco.      Appropriate preventive services were discussed with this patient, including applicable screening as appropriate for cardiovascular disease, diabetes, osteopenia/osteoporosis, and glaucoma.  As appropriate for age/gender, discussed screening for colorectal cancer, prostate cancer, breast cancer, and cervical cancer. Checklist reviewing preventive services available has been given to the patient.    Reviewed patients plan of care and provided an AVS. The Basic Care Plan (routine screening as documented in Health Maintenance) for " Celso meets the Care Plan requirement. This Care Plan has been established and reviewed with the caregiver.    Counseling Resources:  ATP IV Guidelines  Pooled Cohorts Equation Calculator  Breast Cancer Risk Calculator  Breast Cancer: Medication to Reduce Risk  FRAX Risk Assessment  ICSI Preventive Guidelines  Dietary Guidelines for Americans, 2010  USDA's MyPlate  ASA Prophylaxis  Lung CA Screening    Spring Fletcher MD  [facility]    Identified Health Risks:

## 2022-06-24 ENCOUNTER — PRE VISIT (OUTPATIENT)
Dept: OTOLARYNGOLOGY | Facility: CLINIC | Age: 67
End: 2022-06-24
Payer: COMMERCIAL

## 2022-06-24 ASSESSMENT — ENCOUNTER SYMPTOMS
ABDOMINAL PAIN: 1
HEMATURIA: 0
NAUSEA: 0
MYALGIAS: 1
SORE THROAT: 0
EYE PAIN: 0
BREAST MASS: 0
DIZZINESS: 0
HEADACHES: 0
WEAKNESS: 0
FEVER: 0
NERVOUS/ANXIOUS: 1
DYSURIA: 0
SHORTNESS OF BREATH: 0
COUGH: 0
PARESTHESIAS: 0
FREQUENCY: 0
HEMATOCHEZIA: 0
ARTHRALGIAS: 1
PALPITATIONS: 0
HEARTBURN: 1
CHILLS: 0
DIARRHEA: 0
JOINT SWELLING: 0
CONSTIPATION: 1

## 2022-06-24 ASSESSMENT — ACTIVITIES OF DAILY LIVING (ADL): CURRENT_FUNCTION: HOUSEWORK REQUIRES ASSISTANCE

## 2022-06-27 ENCOUNTER — OFFICE VISIT (OUTPATIENT)
Dept: FAMILY MEDICINE | Facility: CLINIC | Age: 67
End: 2022-06-27
Payer: COMMERCIAL

## 2022-06-27 VITALS
BODY MASS INDEX: 19.54 KG/M2 | RESPIRATION RATE: 16 BRPM | SYSTOLIC BLOOD PRESSURE: 118 MMHG | HEART RATE: 80 BPM | OXYGEN SATURATION: 97 % | DIASTOLIC BLOOD PRESSURE: 67 MMHG | HEIGHT: 63 IN | WEIGHT: 110.3 LBS | TEMPERATURE: 97.5 F

## 2022-06-27 DIAGNOSIS — R10.13 DYSPEPSIA AND DISORDER OF FUNCTION OF STOMACH: ICD-10-CM

## 2022-06-27 DIAGNOSIS — K59.00 CONSTIPATION, UNSPECIFIED CONSTIPATION TYPE: ICD-10-CM

## 2022-06-27 DIAGNOSIS — M79.644 THUMB PAIN, RIGHT: ICD-10-CM

## 2022-06-27 DIAGNOSIS — N89.8 VAGINAL DRYNESS: ICD-10-CM

## 2022-06-27 DIAGNOSIS — Z13.220 SCREENING FOR HYPERLIPIDEMIA: ICD-10-CM

## 2022-06-27 DIAGNOSIS — K31.9 DYSPEPSIA AND DISORDER OF FUNCTION OF STOMACH: ICD-10-CM

## 2022-06-27 DIAGNOSIS — Z11.59 NEED FOR HEPATITIS C SCREENING TEST: ICD-10-CM

## 2022-06-27 DIAGNOSIS — F41.1 GENERALIZED ANXIETY DISORDER: ICD-10-CM

## 2022-06-27 DIAGNOSIS — Z11.4 SCREENING FOR HIV (HUMAN IMMUNODEFICIENCY VIRUS): ICD-10-CM

## 2022-06-27 DIAGNOSIS — Z00.00 ROUTINE GENERAL MEDICAL EXAMINATION AT A HEALTH CARE FACILITY: Primary | ICD-10-CM

## 2022-06-27 DIAGNOSIS — Z78.0 ASYMPTOMATIC MENOPAUSAL STATE: ICD-10-CM

## 2022-06-27 PROCEDURE — G0402 INITIAL PREVENTIVE EXAM: HCPCS | Performed by: FAMILY MEDICINE

## 2022-06-27 RX ORDER — LORAZEPAM 0.5 MG/1
0.25 TABLET ORAL 3 TIMES DAILY
COMMUNITY
Start: 2022-06-19

## 2022-06-27 RX ORDER — ESZOPICLONE 1 MG/1
1 TABLET, FILM COATED ORAL AT BEDTIME
COMMUNITY
End: 2022-06-27

## 2022-06-27 RX ORDER — MIRTAZAPINE 7.5 MG/1
5 TABLET, FILM COATED ORAL AT BEDTIME
COMMUNITY
Start: 2022-06-01

## 2022-06-27 RX ORDER — ESTRADIOL 0.1 MG/G
1 CREAM VAGINAL
Qty: 30 G | Refills: 11 | Status: SHIPPED | OUTPATIENT
Start: 2022-06-27 | End: 2023-05-23

## 2022-06-27 ASSESSMENT — PAIN SCALES - GENERAL: PAINLEVEL: EXTREME PAIN (9)

## 2022-06-27 ASSESSMENT — ENCOUNTER SYMPTOMS
WEAKNESS: 0
BREAST MASS: 0
MYALGIAS: 1
CHILLS: 0
HEARTBURN: 1
HEMATURIA: 0
PARESTHESIAS: 0
FEVER: 0
PALPITATIONS: 0
DIZZINESS: 0
NAUSEA: 0
NERVOUS/ANXIOUS: 1
EYE PAIN: 0
JOINT SWELLING: 0
DYSURIA: 0
HEMATOCHEZIA: 0
SORE THROAT: 0
FREQUENCY: 0
CONSTIPATION: 1
ABDOMINAL PAIN: 1
DIARRHEA: 0
SHORTNESS OF BREATH: 0
HEADACHES: 0
COUGH: 0
ARTHRALGIAS: 1

## 2022-06-27 ASSESSMENT — ACTIVITIES OF DAILY LIVING (ADL): CURRENT_FUNCTION: HOUSEWORK REQUIRES ASSISTANCE

## 2022-06-27 NOTE — PATIENT INSTRUCTIONS
Patient Education   Personalized Prevention Plan  You are due for the preventive services outlined below.  Your care team is available to assist you in scheduling these services.  If you have already completed any of these items, please share that information with your care team to update in your medical record.  Health Maintenance Due   Topic Date Due     Osteoporosis Screening  Never done     ANNUAL REVIEW OF HM ORDERS  Never done     HIV Screening  Never done     Hepatitis C Screening  Never done     Zoster (Shingles) Vaccine (1 of 2) Never done     Cholesterol Lab  04/06/2015     Your Health Risk Assessment indicates you feel you are not in good health    A healthy lifestyle helps keep the body fit and the mind alert. It helps protect you from disease, helps you fight disease, and helps prevent chronic disease (disease that doesn't go away) from getting worse. This is important as you get older and begin to notice twinges in muscles and joints and a decline in the strength and stamina you once took for granted. A healthy lifestyle includes good healthcare, good nutrition, weight control, recreation, and regular exercise. Avoid harmful substances and do what you can to keep safe. Another part of a healthy lifestyle is stay mentally active and socially involved.    Good healthcare     Have a wellness visit every year.     If you have new symptoms, let us know right away. Don't wait until the next checkup.     Take medicines exactly as prescribed and keep your medicines in a safe place. Tell us if your medicine causes problems.   Healthy diet and weight control     Eat 3 or 4 small, nutritious, low-fat, high-fiber meals a day. Include a variety of fruits, vegetables, and whole-grain foods.     Make sure you get enough calcium in your diet. Calcium, vitamin D, and exercise help prevent osteoporosis (bone thinning).     If you live alone, try eating with others when you can. That way you get a good meal and have  company while you eat it.     Try to keep a healthy weight. If you eat more calories than your body uses for energy, it will be stored as fat and you will gain weight.     Recreation   Recreation is not limited to sports and team events. It includes any activity that provides relaxation, interest, enjoyment, and exercise. Recreation provides an outlet for physical, mental, and social energy. It can give a sense of worth and achievement. It can help you stay healthy.    Mental Exercise and Social Involvement  Mental and emotional health is as important as physical health. Keep in touch with friends and family. Stay as active as possible. Continue to learn and challenge yourself.   Things you can do to stay mentally active are:    Learn something new, like a foreign language or musical instrument.     Play SCRABBLE or do crossword puzzles. If you cannot find people to play these games with you at home, you can play them with others on your computer through the Internet.     Join a games club--anything from card games to chess or checkers or lawn bowling.     Start a new hobby.     Go back to school.     Volunteer.     Read.   Keep up with world events.  Activities of Daily Living    Your Health Risk Assessment indicates you have difficulties with activities of daily living such as housework, bathing, preparing meals, taking medication, etc. Please make a follow up appointment for us to address this issue in more detail.  Your Health Risk Assessment indicates you feel you are not in good emotional health.    Recreation   Recreation is not limited to sports and team events. It includes any activity that provides relaxation, interest, enjoyment, and exercise. Recreation provides an outlet for physical, mental, and social energy. It can give a sense of worth and achievement. It can help you stay healthy.    Mental Exercise and Social Involvement  Mental and emotional health is as important as physical health. Keep in  touch with friends and family. Stay as active as possible. Continue to learn and challenge yourself.   Things you can do to stay mentally active are:    Learn something new, like a foreign language or musical instrument.     Play SCRABBLE or do crossword puzzles. If you cannot find people to play these games with you at home, you can play them with others on your computer through the Internet.     Join a games club--anything from card games to chess or checkers or lawn bowling.     Start a new hobby.     Go back to school.     Volunteer.     Read.   Keep up with world events.

## 2022-06-27 NOTE — PROGRESS NOTES
"    The patient was provided with suggestions to help her develop a healthy physical lifestyle.  The patient reports that she has difficulty with activities of daily living. I have asked that the patient make a follow up appointment in 4 weeks where this issue will be further evaluated and addressed.  The patient was provided with suggestions to help her develop a healthy emotional lifestyle.  Answers for HPI/ROS submitted by the patient on 6/24/2022  In general, how would you rate your overall physical health?: fair  Frequency of exercise:: 6-7 days/week  Do you usually eat at least 4 servings of fruit and vegetables a day, include whole grains & fiber, and avoid regularly eating high fat or \"junk\" foods? : Yes  Taking medications regularly:: Yes  Medication side effects:: Not applicable  Activities of Daily Living: housework requires assistance  Home safety: lack of grab bars in the bathroom  Hearing Impairment:: no hearing concerns  In the past 6 months, have you been bothered by leaking of urine?: No  abdominal pain: Yes  Blood in stool: No  Blood in urine: No  chest pain: No  chills: No  congestion: No  constipation: Yes  cough: No  diarrhea: No  dizziness: No  ear pain: No  eye pain: No  nervous/anxious: Yes  fever: No  frequency: No  genital sores: No  headaches: No  hearing loss: No  heartburn: Yes  arthralgias: Yes  joint swelling: No  peripheral edema: No  mood changes: Yes  myalgias: Yes  nausea: No  dysuria: No  palpitations: No  Skin sensation changes: No  sore throat: No  urgency: Yes  rash: Yes  shortness of breath: No  visual disturbance: No  weakness: No  pelvic pain: Yes  vaginal bleeding: No  vaginal discharge: No  tenderness: No  breast mass: No  breast discharge: No  In general, how would you rate your overall mental or emotional health?: fair  Additional concerns today:: No  Duration of exercise:: 15-30 minutes      "

## 2022-06-27 NOTE — NURSING NOTE
"Chief Complaint   Patient presents with     Physical     /67   Pulse 80   Temp 97.5  F (36.4  C) (Temporal)   Resp 16   Ht 1.595 m (5' 2.8\")   Wt 50 kg (110 lb 4.8 oz)   LMP  (LMP Unknown)   SpO2 97%   BMI 19.67 kg/m   Estimated body mass index is 19.67 kg/m  as calculated from the following:    Height as of this encounter: 1.595 m (5' 2.8\").    Weight as of this encounter: 50 kg (110 lb 4.8 oz).  bp completed using cuff size: regular      Health Maintenance addressed:  NONE    n/a    Grace Taveras, RN, MA     "

## 2022-06-28 ENCOUNTER — TRANSFERRED RECORDS (OUTPATIENT)
Dept: HEALTH INFORMATION MANAGEMENT | Facility: CLINIC | Age: 67
End: 2022-06-28

## 2022-06-28 ENCOUNTER — TELEPHONE (OUTPATIENT)
Dept: FAMILY MEDICINE | Facility: CLINIC | Age: 67
End: 2022-06-28

## 2022-06-28 NOTE — TELEPHONE ENCOUNTER
Dilia,   Patient called.   Requesting outside [place] referral to [facility] ([place])  Nutritionist - Sophy Ibrahim  Phone # [phone]    Thanks!  Darcy RANGEL

## 2022-07-05 ENCOUNTER — MYC MEDICAL ADVICE (OUTPATIENT)
Dept: FAMILY MEDICINE | Facility: CLINIC | Age: 67
End: 2022-07-05

## 2022-07-05 ENCOUNTER — THERAPY VISIT (OUTPATIENT)
Dept: PHYSICAL THERAPY | Facility: CLINIC | Age: 67
End: 2022-07-05
Payer: COMMERCIAL

## 2022-07-05 ENCOUNTER — TRANSFERRED RECORDS (OUTPATIENT)
Dept: HEALTH INFORMATION MANAGEMENT | Facility: CLINIC | Age: 67
End: 2022-07-05

## 2022-07-05 DIAGNOSIS — M25.551 BILATERAL HIP PAIN: Primary | ICD-10-CM

## 2022-07-05 DIAGNOSIS — M25.552 BILATERAL HIP PAIN: Primary | ICD-10-CM

## 2022-07-05 DIAGNOSIS — D22.9 NUMEROUS MOLES: Primary | ICD-10-CM

## 2022-07-05 PROCEDURE — 97110 THERAPEUTIC EXERCISES: CPT | Mod: GP

## 2022-07-05 NOTE — PROGRESS NOTES
PROGRESS  REPORT    Progress reporting period is from initial eval to 7/5/22.       SUBJECTIVE  Subjective changes noted by patient: Feels that back pain is slowly subsiding. Still has pain with walking more than 20-30 min. Exercises have been going well, using ball to roll out trigger points. Does 1 set of 10, and sometimes adds second set of 5 reps.     Current Pain level: 3/10.      Initial Pain level: 7/10.   Changes in function:  Yes (See Goal flowsheet attached for changes in current functional level)  Adverse reaction to treatment or activity: None    OBJECTIVE  Changes noted in objective findings:  Yes, see below.  Objective: Good performance of HEP today w/ need of a few cues for proper performance, no increased pain w/ HEP. Continues to demonstrate decreased lumbar mobility, hip weakness and some hesitancy to movement. Will benefit from continued guidance and progression of exercises.     ASSESSMENT/PLAN  Updated problem list and treatment plan: Diagnosis 1:  Hip pain, LBP  Pain -  manual therapy, splint/taping/bracing/orthotics, self management, education, directional preference exercise and home program  Decreased ROM/flexibility - manual therapy, therapeutic exercise, therapeutic activity and home program  Decreased strength - therapeutic exercise, therapeutic activities and home program  Impaired balance - neuro re-education, therapeutic activities and home program  Decreased function - therapeutic activities and home program  STG/LTGs have been met or progress has been made towards goals:  Yes (See Goal flow sheet completed today.)  Assessment of Progress: The patient's condition is improving.  Self Management Plans:  Patient has been instructed in a home treatment program.  I have re-evaluated this patient and find that the nature, scope, duration and intensity of the therapy is appropriate for the medical condition of the patient.  Celso continues to require the following intervention to meet STG  and LTG's:  PT    Recommendations:  This patient would benefit from continued therapy.     Frequency:  2 X a month, once daily  Duration:  for 3 months        Please refer to the daily flowsheet for treatment today, total treatment time and time spent performing 1:1 timed codes.

## 2022-07-05 NOTE — PROGRESS NOTES
[facility]    OUTPATIENT Physical Therapy ORTHOPEDIC EVALUATION  PLAN OF TREATMENT FOR OUTPATIENT REHABILITATION  (COMPLETE FOR INITIAL CLAIMS ONLY)  Patient's Last Name, First Name, M.I.  YOB: 1955  Celso Cates       Provider s Name:  [facility]   Medical Record No.  [id number]   Start of Care Date:  05/06/22   Onset Date:   01/01/22   Type:     _X__PT   ___OT Medical Diagnosis:    Encounter Diagnosis   Name Primary?     Bilateral hip pain Yes        Treatment Diagnosis:  bilateral hip pain        Goals:     07/05/22 0500   Body Part   Goals listed below are for Hip pain   Goal #1   Goal #1 ambulation   Previous Functional Level No restrictions   Current Functional Level Minutes patient can walk   Performance Level 10 minutes; pain 4/10   STG Target Performance Minutes patient will be able to walk   Performance Level 15 min 3/10 pain   Rationale for safe household ambulation;for safe outdoor household ambulation;for safe community ambulation;to maintain proper body mechanics/posture while ambulating to avoid additional compensatory injury due to improper gait mechanics;to promote a healthy and active lifestyle   Due Date 06/03/22   Date Goal Met 07/05/22    LTG Target Performance Minutes patient will be able to  walk   Performance Level 30 minutes 1/10 pain   Rationale for safe household ambulation;for safe outdoor household ambulation;for safe community ambulation;to maintain proper body mechanics/posture while ambulating to avoid additional compensatory injury due to improper gait mechanics;to promote a healthy and active lifestyle   Due Date 08/30/22   If goal not met, Why? progressing, date extended as of 7/5       Therapy Frequency:  2x/month  Predicted Duration of Therapy Intervention:  3 months    Jocelyn Baum PT                 I CERTIFY THE NEED FOR  THESE SERVICES FURNISHED UNDER        THIS PLAN OF TREATMENT AND WHILE UNDER MY CARE     (Physician attestation of this document indicates review and certification of the therapy plan).                       Certification Date From:  07/02/22   Certification Date To:  10/02/22    Referring Provider: Self referred    Initial Assessment        See Epic Evaluation SOC Date: 05/06/22

## 2022-07-06 ENCOUNTER — THERAPY VISIT (OUTPATIENT)
Dept: OCCUPATIONAL THERAPY | Facility: CLINIC | Age: 67
End: 2022-07-06
Attending: FAMILY MEDICINE
Payer: COMMERCIAL

## 2022-07-06 DIAGNOSIS — M79.644 THUMB PAIN, RIGHT: Primary | ICD-10-CM

## 2022-07-06 PROCEDURE — 97760 ORTHOTIC MGMT&TRAING 1ST ENC: CPT | Mod: GO | Performed by: OCCUPATIONAL THERAPIST

## 2022-07-06 PROCEDURE — 97535 SELF CARE MNGMENT TRAINING: CPT | Mod: GO | Performed by: OCCUPATIONAL THERAPIST

## 2022-07-06 PROCEDURE — 97166 OT EVAL MOD COMPLEX 45 MIN: CPT | Mod: GO | Performed by: OCCUPATIONAL THERAPIST

## 2022-07-06 NOTE — PROGRESS NOTES
Hand Therapy Initial Evaluation    Current Date:  7/6/2022    Diagnosis: R thumb pain (tendintis and the thumb MCP per MD)    Per special testing on 7/6/2022 Pt appears to have irritation of the right thumb CMC joint as well as irritation of the thumb APB muscle.    Orders 6/27/22  Onset ~ 3 weeks ago    Precautions: None    Subjective:  Celso Cates is a 66 year old female.    Patient reports symptoms of the right thumb. Since onset symptoms are Gradually getting better.     Trying to use the L hand more. It will be sore with typing. Its been a few weeks that it's been really sore. At first I did go to urgent care.  Grabbing and turning the steering wheel can bother it.    Answers for HPI/ROS submitted by the patient on 7/4/2022  Reason for Visit:: Thumb sprain  When problem began:: 6/1/2022  How problem occurred:: Overuse injury using thumb to apply pressure to hip  Number scale: 5/10  General health as reported by patient: fair  Please check all that apply to your current or past medical history: changes in bowel/bladder, unexplained weight loss  Surgeries: other  Other Surgery Detail: pelvic reconstruction in 2014  Medications you are currently taking: anti-depressants, sleep medication  Occupation:: Professor  What are your primary job tasks: computer work, repetitive tasks    Occupational Profile Information:  Right hand dominant  Prior functional level:  no limitations  Patient reports symptoms of pain, stiffness/loss of motion, weakness/loss of strength and edema  Special tests:  none.    Previous treatment: rest  Barriers include:none  Mobility: No difficulty  Transportation: drives  Currently working in normal job without restrictions. Working ~1/2 days, limiting as best she can.   Leisure activities/hobbies: has been working on [employer] (wants to complete a baby blanket.    Functional Outcome Measure:   Upper Extremity Functional Index Score:  SCORE:   Column Totals: /80: (P) 31   (A lower score indicates  greater disability.)    Objective:  Pain Level (Scale 0-10)   7/6/2022   At Rest 0   With Use 5     Pain Description  Date 7/6/2022   Location Base of the thumb   Pain Quality Sharp, Shooting and Tender.  Its nagging - tootie with typing   Frequency intermittent     Pain is worst  daytime   Exacerbated by  certain motions, , turn.  Stretch and press with the R thumb. Trying to use L thumb on spacebar   Relieved by Rest. Tylenol seems to help a bit - does not take ibuprofen   Progression  slowly improving possibly     Edema  Mild, of the R IF    Sensation   WNL throughout all nerve distributions; per patient report    AROM:   B wrists are WNL all planes    ROM  Thumb 7/6/2022 7/6/2022   AROM  (PROM) R L   MP /50 /55   IP /32 /45   RABD 35 26   PABD 35, pain ~10 sec after 40   Retropulsion     Kapandji Opposition Scale (0-10/10)         Palpation:   Tenderness / pain Report: - none  + mild    ++ moderate    +++ severe      7/6/2022 7/6/2022   Location Right Left   Radial thumb CMCj + -   Volar thumb CMC joint + -   Thumb APB - mid muscle + -   Scaphoid tubercle - -   1st dorsal compartment - -   Extensor wad + at EDC/ECRB/L        Strength  Testing deferred    Assessment:  Patient presents with symptoms consistent with diagnosis of right thumb  pain,  with conservative intervention. Pt appears to have irritation of the right thumb CMC joint as well as irritation of the thumb APB muscle.    Patient's limitations or Problem List includes:  Pain and Weakness of the right thumb which interferes with the patient's ability to perform Self Care Tasks, driving, Work Tasks, Recreational Activities and Household Chores as compared to previous level of function.    Rehab Potential:  Excellent - Return to full activity, no limitations    Patient will benefit from skilled Occupational Therapy to increase overall strength and decrease pain to return to previous activity level and resume normal daily tasks and to reach their  rehab potential.    Barriers to Learning:  No barrier    Communication Issues:  Patient appears to be able to clearly communicate and understand verbal and written communication and follow directions correctly.    Chart Review: Brief history including review of medical and/or therapy records relating to the presenting problem and Detailed history review with patient    Identified Performance Deficits: dressing, home establishment and management, meal preparation and cleanup, work and leisure activities    Assessment of Occupational Performance:  3-5 Performance Deficits    Clinical Decision Making (Complexity): Moderate complexity    Treatment Explanation:  The following has been discussed with the patient:  RX ordered/plan of care  Anticipated outcomes  Possible risks and side effects    Plan:  Frequency:  2 X a month, once daily  Duration:  for 3 months    Treatment Plan:   Modalities:  US and Paraffin  Therapeutic Exercise:  AROM, Isotonics, Isometrics and Stabilization  Neuromuscular re-education:  Nerve Gliding, Coordination/Dexterity, Proprioceptive Training and Kinesiotaping  Orthotic Fabrication:  Static, Hand based and Forearm based  Self Care:  Self Care Tasks, Ergonomic Considerations and Work Tasks    Discharge Plan:  Achieve all LTG.  Independent in home treatment program.  Reach maximal therapeutic benefit.    Home Exercise Program:  Right small ottobock orthosis to wear at night, and during the day if able  Avoid positions of thumb irritation  Provided with some joint protection handout  Discussed knitting, and provided with a few pages about some techniques for continental style knitting    Next Visit:  Check symptoms, check orthosis  Exercise when/if indicated  Joint protection/adaptations if needed

## 2022-07-07 NOTE — PROGRESS NOTES
[facility]    OUTPATIENT Occupational Therapy ORTHOPEDIC EVALUATION  PLAN OF TREATMENT FOR OUTPATIENT REHABILITATION  (COMPLETE FOR INITIAL CLAIMS ONLY)  Patient's Last Name, First Name, M.I.  YOB: 1955  Celso Cates       Provider s Name:  [facility]   Medical Record No.  [id number]   Start of Care Date:  07/06/22   Onset Date:   06/27/22 (Orders)   Type:     OT Medical Diagnosis:    Encounter Diagnosis   Name Primary?    Thumb pain, right Yes        Treatment Diagnosis:           Goals:     07/06/22 0500   Goal #1   Goal #1 household chores   Previous Performance Level Independent   Current Functional Task Other - on additional line   Other Household Chores Perform food preparation   Current Performance Level Quite a bit of difficulty and 5/10 pain   STG Target Perfomance Other - on additional line   Other Household Chores Perform food preparation   STG Target Perform Level With mild difficulty and 2/10 pain or less   Due Date 08/06/22   LTG Target Task/Performance Pain free household chores   Due Date 09/06/22       Therapy Frequency:  2 X a month, once daily  Predicted Duration of Therapy Intervention:  for 3 months    Lyn Gibbs, CAIT                 I CERTIFY THE NEED FOR THESE SERVICES FURNISHED UNDER        THIS PLAN OF TREATMENT AND WHILE UNDER MY CARE     (Physician attestation of this document indicates review and certification of the therapy plan).                     Certification Date From:  07/06/22   Certification Date To:  10/06/22    Referring Provider:  Spring Fletcher    Initial Assessment        See Epic Evaluation SOC Date: 07/06/22

## 2022-07-11 ENCOUNTER — TRANSFERRED RECORDS (OUTPATIENT)
Dept: HEALTH INFORMATION MANAGEMENT | Facility: CLINIC | Age: 67
End: 2022-07-11

## 2022-07-11 LAB — TSH SERPL-ACNC: 0.8 UIU/ML (ref 0.45–4.5)

## 2022-07-12 ENCOUNTER — ANCILLARY PROCEDURE (OUTPATIENT)
Dept: CT IMAGING | Facility: CLINIC | Age: 67
End: 2022-07-12
Attending: INTERNAL MEDICINE
Payer: COMMERCIAL

## 2022-07-12 ENCOUNTER — ANCILLARY PROCEDURE (OUTPATIENT)
Dept: BONE DENSITY | Facility: CLINIC | Age: 67
End: 2022-07-12
Attending: FAMILY MEDICINE
Payer: COMMERCIAL

## 2022-07-12 DIAGNOSIS — R19.4 CHANGE IN BOWEL HABITS: ICD-10-CM

## 2022-07-12 DIAGNOSIS — R10.13 EPIGASTRIC PAIN: ICD-10-CM

## 2022-07-12 DIAGNOSIS — R63.4 ABNORMAL WEIGHT LOSS: ICD-10-CM

## 2022-07-12 DIAGNOSIS — Z78.0 ASYMPTOMATIC MENOPAUSAL STATE: ICD-10-CM

## 2022-07-12 PROCEDURE — 77080 DXA BONE DENSITY AXIAL: CPT | Performed by: INTERNAL MEDICINE

## 2022-07-12 PROCEDURE — 74177 CT ABD & PELVIS W/CONTRAST: CPT | Performed by: RADIOLOGY

## 2022-07-12 RX ORDER — IOPAMIDOL 755 MG/ML
68 INJECTION, SOLUTION INTRAVASCULAR ONCE
Status: COMPLETED | OUTPATIENT
Start: 2022-07-12 | End: 2022-07-12

## 2022-07-12 RX ADMIN — IOPAMIDOL 68 ML: 755 INJECTION, SOLUTION INTRAVASCULAR at 16:55

## 2022-07-14 ENCOUNTER — OFFICE VISIT (OUTPATIENT)
Dept: OPHTHALMOLOGY | Facility: CLINIC | Age: 67
End: 2022-07-14
Payer: COMMERCIAL

## 2022-07-14 DIAGNOSIS — Z98.890 H/O LASER ASSISTED IN SITU KERATOMILEUSIS: ICD-10-CM

## 2022-07-14 DIAGNOSIS — H02.889 MEIBOMIAN GLAND DYSFUNCTION: Primary | ICD-10-CM

## 2022-07-14 DIAGNOSIS — Z86.69 H/O RETINAL DETACHMENT: ICD-10-CM

## 2022-07-14 PROCEDURE — 92004 COMPRE OPH EXAM NEW PT 1/>: CPT | Performed by: OPTOMETRIST

## 2022-07-14 ASSESSMENT — VISUAL ACUITY
OS_SC: 20/20
METHOD: SNELLEN - LINEAR
OD_SC+: -2
METHOD_MR: PT DECLINES.
OD_SC: 20/20

## 2022-07-14 ASSESSMENT — CONF VISUAL FIELD
METHOD: COUNTING FINGERS
OD_NORMAL: 1
OS_NORMAL: 1

## 2022-07-14 ASSESSMENT — CUP TO DISC RATIO
OS_RATIO: 0.55
OD_RATIO: 0.55

## 2022-07-14 ASSESSMENT — TONOMETRY
OD_IOP_MMHG: 20
OS_IOP_MMHG: 20
IOP_METHOD: ICARE

## 2022-07-14 ASSESSMENT — EXTERNAL EXAM - RIGHT EYE: OD_EXAM: NORMAL

## 2022-07-14 ASSESSMENT — EXTERNAL EXAM - LEFT EYE: OS_EXAM: NORMAL

## 2022-07-14 NOTE — NURSING NOTE
Chief Complaints and History of Present Illnesses   Patient presents with     COMPREHENSIVE EYE EXAM     New Pt here for annual CEE.     Chief Complaint(s) and History of Present Illness(es)     COMPREHENSIVE EYE EXAM     Laterality: both eyes    Onset: gradual    Onset: years ago    Associated symptoms: dryness, flashes, floaters and itching.  Negative for glare, haloes, eye pain, tearing and photophobia    Treatments tried: artificial tears and warm compresses    Pain scale: 0/10    Comments: New Pt here for annual CEE.              Comments     Pt had new glasses about 9 months ago, reading and computer/reading.  Still has stye LE, treating with WC and lid hygiene.  Hx of retinal tear in LE in 11/22, repaired with laser.  PVD LE.  No change to flashes/floaters.  Hx of Lasik BE about 30 years ago.    CHANA Gorman July 14, 2022 9:03 AM

## 2022-07-14 NOTE — PATIENT INSTRUCTIONS
Lid scrubs: These can help prevent buildup of debris on eyelids/eyelashes and help reduce inflammation of the eyelids (blepharitis). Use daily.  -Ocusoft Plus lid wipes  -Ocusoft Plus Hypochlor foaming lid cleanser  -Systane lid wipes  -Tranquileyes 1% Tea Tree Eyelid & Facial Cleanser by Eye Eco  -Avenova Lid   -Cliradex Lid Wipes  -We Love Eyes Foaming Tea Tree Cleanser  -Oasis-LID  N LASH pads.       Warm compresses: Use 1-2x/day for 5-10 minutes over closed eyelids  -Nyasia mask  -Tranquileyes beaded mask  -Mibo heating pad  -Rockfish REST & RELIEF Eye Mask (Hot or Cold)  -I-RELIEF Therapy Mask  -OcuTherm Essentials Kit    You can also use a warm wet washcloth - however this frequently loses heat quickly and can dry your skin out a bit so we recommend any of the above re-usable beaded/gel eyemasks      To purchase these products you can look over-the-counter at [employer] or purchase online at the following websites:  -[email or web address]  -[email or web address]

## 2022-07-14 NOTE — PROGRESS NOTES
A/P  1.) H/o retinal hole/tear left eye with laser repair  -Attached today. Per pt, left eye was significantly more myopic prior to LASIK  -Reviewed signs/symptoms of RD and need for stat eye eval should they occur    2.) MGD OU  -H/o recent stye/chalazion left eye  -Doing daily warm compress - continue  -Switch from baby shampoo to lid scrub prn    3.) H/o LASIK OU  -Doing well without correction at distance, readers only    Monitor 1-2 years comprehensive, sooner prn    I have confirmed the patient's CC, HPI and reviewed Past Medical History, Past Surgical History, Social History, Family History, Problem List, Medication List and agree with Tech note.     Maty Zepeda, AKI PEDRAZAO WALTERS

## 2022-07-19 ENCOUNTER — THERAPY VISIT (OUTPATIENT)
Dept: PHYSICAL THERAPY | Facility: CLINIC | Age: 67
End: 2022-07-19
Payer: COMMERCIAL

## 2022-07-19 DIAGNOSIS — M25.551 BILATERAL HIP PAIN: Primary | ICD-10-CM

## 2022-07-19 DIAGNOSIS — M25.552 BILATERAL HIP PAIN: Primary | ICD-10-CM

## 2022-07-19 PROCEDURE — 97112 NEUROMUSCULAR REEDUCATION: CPT | Mod: GP

## 2022-07-19 PROCEDURE — 97110 THERAPEUTIC EXERCISES: CPT | Mod: GP

## 2022-07-20 ENCOUNTER — THERAPY VISIT (OUTPATIENT)
Dept: OCCUPATIONAL THERAPY | Facility: CLINIC | Age: 67
End: 2022-07-20
Payer: COMMERCIAL

## 2022-07-20 DIAGNOSIS — M79.644 THUMB PAIN, RIGHT: Primary | ICD-10-CM

## 2022-07-20 PROCEDURE — 97110 THERAPEUTIC EXERCISES: CPT | Mod: GO | Performed by: OCCUPATIONAL THERAPIST

## 2022-07-20 PROCEDURE — 97535 SELF CARE MNGMENT TRAINING: CPT | Mod: GO | Performed by: OCCUPATIONAL THERAPIST

## 2022-07-25 ENCOUNTER — THERAPY VISIT (OUTPATIENT)
Dept: OCCUPATIONAL THERAPY | Facility: CLINIC | Age: 67
End: 2022-07-25
Payer: COMMERCIAL

## 2022-07-25 DIAGNOSIS — M79.644 THUMB PAIN, RIGHT: Primary | ICD-10-CM

## 2022-07-25 PROCEDURE — 97110 THERAPEUTIC EXERCISES: CPT | Mod: GO | Performed by: OCCUPATIONAL THERAPIST

## 2022-07-25 PROCEDURE — 97535 SELF CARE MNGMENT TRAINING: CPT | Mod: GO | Performed by: OCCUPATIONAL THERAPIST

## 2022-08-01 ENCOUNTER — VIRTUAL VISIT (OUTPATIENT)
Dept: FAMILY MEDICINE | Facility: CLINIC | Age: 67
End: 2022-08-01
Payer: COMMERCIAL

## 2022-08-01 DIAGNOSIS — M25.551 PAIN OF BOTH HIP JOINTS: ICD-10-CM

## 2022-08-01 DIAGNOSIS — M85.89 OSTEOPENIA OF MULTIPLE SITES: ICD-10-CM

## 2022-08-01 DIAGNOSIS — M25.552 PAIN OF BOTH HIP JOINTS: ICD-10-CM

## 2022-08-01 DIAGNOSIS — M25.511 RIGHT ANTERIOR SHOULDER PAIN: Primary | ICD-10-CM

## 2022-08-01 PROCEDURE — 99214 OFFICE O/P EST MOD 30 MIN: CPT | Mod: 95 | Performed by: FAMILY MEDICINE

## 2022-08-01 NOTE — PROGRESS NOTES
Celso is a 66 year old who is being evaluated via a billable video visit.      How would you like to obtain your AVS? MyChart  If the video visit is dropped, the invitation should be resent by:            Assessment & Plan     (M25.511) Right anterior shoulder pain  (primary encounter diagnosis)  Comment: advised to avoid   Plan: Physical Therapy Referral, Vitamin B12, XR         Shoulder Right 2 Views        (M25.551,  M25.552) Pain of both hip joints  Comment: Plan: Vitamin B12, XR Hip Right 2-3 Views          (M85.89) Osteopenia of multiple sites  Comment: weight bearing exercise- repeat bone density in 1-2 yrs  Plan: Vitamin D Deficiency            Risk of Hip Fracture:2.5% (Significant if >3%)  Risk of Overall Fracture: 11.3 (Significant if >20%)    Ordering of each unique test  Prescription drug management             Return in about 1 week (around 8/8/2022) for concerns,unresolved.    Spring Fletcher MD  [facility]   Celso is a 66 year old, presenting for the following health issues:  No chief complaint on file.      History of Present Illness       Reason for visit:  Bone density scan follow up and shoulder pain  Symptom onset:  3-4 weeks ago  Symptoms include:  Shoulder and arm pain  Symptom intensity:  Moderate  Symptom progression:  Staying the same  Had these symptoms before:  Yes  Has tried/received treatment for these symptoms:  Yes  Previous treatment was successful:  Yes  Prior treatment description:  Physical therapy  What makes it worse:  Computer use  What makes it better:  Physical therapy    She eats 4 or more servings of fruits and vegetables daily.She consumes 0 sweetened beverage(s) daily.She exercises with enough effort to increase her heart rate 20 to 29 minutes per day.  She exercises with enough effort to increase her heart rate 7 days per week.   She is taking medications regularly.     Moderate to sever Right shoulder pain since past 2-3 weeks,  right dominant,pain is recurrent , tootie if reaching behind or reaching above shoulder & when seated.New ergonomic set up . Has been working with PHYSICAL THERAPY .  Has taken tylenol , does not help the pain.  Lidocaine- helps.pain may last up to an hr. It has woken her up.  Sometimes the right shoulder pain radiates to the right lower arm    Right hip moderate pain since 8 months  Pain is worse with activity and walking, climbing stairs.  Heating pad helps. And resting on back        Review of Systems   Constitutional, HEENT, cardiovascular, pulmonary, GI, , musculoskeletal, neuro, skin, endocrine and psych systems are negative, except as otherwise noted.      Objective           Vitals:  No vitals were obtained today due to virtual visit.    Physical Exam   GENERAL: Healthy, alert and no distress  EYES: Eyes grossly normal to inspection.  No discharge or erythema, or obvious scleral/conjunctival abnormalities.  RESP: No audible wheeze, cough, or visible cyanosis.  No visible retractions or increased work of breathing.    SKIN: Visible skin clear. No significant rash, abnormal pigmentation or lesions.  NEURO: Cranial nerves grossly intact.  Mentation and speech appropriate for age.  PSYCH: Mentation appears normal, affect normal/bright, judgement and insight intact, normal speech and appearance well-groomed.                Video-Visit Details    Video Start Time: 4:17 PM    Type of service:  Video Visit    Video End Time:4:38 PM    Originating Location (pt. Location): Home    Distant Location (provider location):  [facility]     Platform used for Video Visit: FestEvo    Kalie Jade.

## 2022-08-02 ENCOUNTER — THERAPY VISIT (OUTPATIENT)
Dept: OCCUPATIONAL THERAPY | Facility: CLINIC | Age: 67
End: 2022-08-02
Payer: COMMERCIAL

## 2022-08-02 DIAGNOSIS — M79.644 THUMB PAIN, RIGHT: Primary | ICD-10-CM

## 2022-08-02 PROCEDURE — 97763 ORTHC/PROSTC MGMT SBSQ ENC: CPT | Mod: GO | Performed by: OCCUPATIONAL THERAPIST

## 2022-08-02 PROCEDURE — 97535 SELF CARE MNGMENT TRAINING: CPT | Mod: GO | Performed by: OCCUPATIONAL THERAPIST

## 2022-08-02 NOTE — PROGRESS NOTES
SOAP note information for 8/2/2022.  Please refer to the daily flowsheet for treatment today, total treatment time and time spent performing 1:1 timed codes.       Diagnosis: R thumb pain (tendinitis, thumb MCP per MD)  Per special testing on 7/6/2022 Pt appears to have irritation of the right thumb CMC joint as well as irritation of the thumb APB muscle.    Orders 6/27/22  Onset ~ 3 weeks ago    Precautions: None    Subjective:  Protecting the R thumb as best as able. L is generally pretty good. R shoulder has been really sore, hard to sleep.    Occupational Profile Information:  Right hand dominant  Prior functional level:  no limitations  Patient reports symptoms of pain, stiffness/loss of motion, weakness/loss of strength and edema  Currently working in normal job without restrictions. Working ~1/2 days, limiting as best she can.   Leisure activities/hobbies: has been working on [employer] (wants to complete a baby blanket)    Objective:  Pain Level (Scale 0-10)   7/6/2022   At Rest 0   With Use 5     Pain Description  Date 7/6/2022   Location Base of the thumb   Pain Quality Sharp, Shooting and Tender.  Its nagging - tootie with typing   Frequency intermittent     Pain is worst  daytime   Exacerbated by  certain motions, , turn.  Stretch and press with the R thumb. Trying to use L thumb on spacebar   Relieved by Rest. Tylenol seems to help a bit - does not take ibuprofen   Progression  slowly improving possibly     Edema  Mild, of the R IF    Sensation   WNL throughout all nerve distributions; per patient report    AROM:   B wrists are WNL all planes    ROM  Thumb 7/6/2022 7/6/2022   AROM  (PROM) R L   MP /50 /55   IP /32 /45   RABD 35 26   PABD 35, pain ~10 sec after 40   Retropulsion     Kapandji Opposition Scale (0-10/10)         Palpation:   Tenderness / pain Report: - none  + mild    ++ moderate    +++ severe      7/6/2022 7/6/2022    Location Right Left    Radial thumb CMCj + -    Volar thumb CMC joint +  -    Thumb APB - mid muscle + -    Scaphoid tubercle - -    1st dorsal compartment - -    Extensor wad + at EDC/ECRB/L         Palpation:   Pain Report:  - none    + mild    ++ moderate    +++ severe     8/2/2022      Right   Traps - upper trap, and upper medial +   Supraspinatus on scapula ++   Triangular Interval  (between long and lateral heads of triceps and just inferior to teres major) +   Anterior shoulder +   Lateral shoulder +         Strength  Testing deferred    Home Exercise Program:  Right small ottobock orthosis to wear at night - prefers her neoprene wrap  Avoid positions of thumb irritation  Provided with some joint protection handout  Discussed knitting, and provided with a few pages about some techniques for continental style knitting  Work stretches, work breaks, foam roller  Working with Jocelyn for back pain  Raising arm overhead can be painful    Next Visit:  Check symptoms, check orthosis  Exercise  Joint protection/adaptations if needed

## 2022-08-04 ENCOUNTER — THERAPY VISIT (OUTPATIENT)
Dept: PHYSICAL THERAPY | Facility: CLINIC | Age: 67
End: 2022-08-04
Attending: FAMILY MEDICINE
Payer: COMMERCIAL

## 2022-08-04 ENCOUNTER — THERAPY VISIT (OUTPATIENT)
Dept: PHYSICAL THERAPY | Facility: CLINIC | Age: 67
End: 2022-08-04
Payer: COMMERCIAL

## 2022-08-04 DIAGNOSIS — M25.511 RIGHT ANTERIOR SHOULDER PAIN: ICD-10-CM

## 2022-08-04 DIAGNOSIS — M25.551 BILATERAL HIP PAIN: Primary | ICD-10-CM

## 2022-08-04 DIAGNOSIS — M25.552 BILATERAL HIP PAIN: Primary | ICD-10-CM

## 2022-08-04 PROCEDURE — 97110 THERAPEUTIC EXERCISES: CPT | Mod: GP

## 2022-08-04 PROCEDURE — 97161 PT EVAL LOW COMPLEX 20 MIN: CPT | Mod: GP

## 2022-08-04 NOTE — PROGRESS NOTES
[facility]    OUTPATIENT Physical Therapy ORTHOPEDIC EVALUATION  PLAN OF TREATMENT FOR OUTPATIENT REHABILITATION  (COMPLETE FOR INITIAL CLAIMS ONLY)  Patient's Last Name, First Name, M.I.  YOB: 1955  Celso Cates       Provider s Name:  [facility]   Medical Record No.  [id number]   Start of Care Date:  08/04/22   Onset Date:   08/01/22 (MD order)   Type:     _X__PT   ___OT Medical Diagnosis:    Encounter Diagnosis   Name Primary?    Right anterior shoulder pain         Treatment Diagnosis:  R shoulder pain        Goals:     08/04/22 0500   Body Part   Goals listed below are for R shoulder   Goal #1   Goal #1 sitting  (and lying)   Previous Functional Level No restrictions   Current Functional Level Minutes patient can sit   Performance level 15 min pain up to 6/10   STG Target Performance Minutes patient will be able to sit   Performance level 30 min pain ng than 3/10   Rationale for personal hygiene;to allow rest from standing;for community transportation;for job requirements in their work place   Due date 09/08/22   LTG Target Performance Minutes patient will be able to sit   Performance Level 60 pain ng than 2/10   Rationale for personal hygiene;to allow rest from standing;for community transportation;for job requirements in their work place   Due date 09/29/22       Therapy Frequency:  1x/week  Predicted Duration of Therapy Intervention:   8 weeks    Jocelyn Baum, PT                 I CERTIFY THE NEED FOR THESE SERVICES FURNISHED UNDER        THIS PLAN OF TREATMENT AND WHILE UNDER MY CARE     (Physician attestation of this document indicates review and certification of the therapy plan).                     Certification Date From:  08/04/22   Certification Date To:  09/29/22    Referring Provider:  Spring Fletcher    Initial Assessment        See Epic  Evaluation SOC Date: 08/04/22

## 2022-08-04 NOTE — PROGRESS NOTES
Physical Therapy Initial Evaluation  Therapist Impression: Celso is a 66 year old year old female referred to physical therapy by Dr. Spring Fletcher for treatment of R shoulder pain, evaluate and treat. Subjective history and objective findings are consistent with R shoulder pain d/t compensations because of thumb pain, likely RC irritation/pathology present. Continued evaluation will be performed. Due to these impairments, patient has difficulty with sitting, sleeping, and certain reaching/lifting activities. Patient will benefit from skilled PT to address impairments/limitations in order to reach patient's goals, facilitate return to prior level of function, and maximize participation.    KEY FINDINGS:  1. KAMRAN shoulder weakness R>L  2. R shoulder ROM limited w/ FE and IR behind the back  3. Negative Spurlings    Subjective:  The history is provided by the patient. No  was used.   Therapist Generated HPI Evaluation  Problem details: Pt presents with R shoulder pain. Has been seeing OT for R thumb pain, but has started noticing pain in the shoulder and going down the arm. It is impacting sleep.  She has also experienced some numbness in R LE. We will address this in a future appointment for her back/hips. Her shoulder started bothering her 3-4 weeks ago. She noticed it the most while working on her computer. She has an OT coming out to evaluate her ergonomic set up. Reports history of R shoulder RC injury - no treatment. Pt is RHD. .         Type of problem:  Right shoulder.    This is a new condition.  Condition occurred with:  Unknown cause.  Where condition occurred: for unknown reasons.  Site of Pain: R shoulder blade and anterior shoulder.  Pain is described as aching and burning and is intermittent.  Radiates to: down R UE into forearm. Pain timing: not time dependent.  Progression since onset: maybe slight improvement.  Associated symptoms:  Loss of strength and loss of motion/stiffness  (denies N/T; reports clicking). Symptoms are exacerbated by using arm overhead (lying on her back, certain sitting positions. Is avoiding lifting/carrying with RH d/t thumb)  and relieved by rest (hot shower, lidocaine patches).      Restrictions due to condition include:  Working in normal job without restrictions.  Barriers include:  None as reported by patient.    Patient Health History         Pain is reported as 6/10 on pain scale.  General health as reported by patient is fair.  Pertinent medical history includes: menopausal and numbness/tingling.   Red flags:  Pain at rest/night and unexplained weight loss (pain at night/rest MSK in nature at this time.  Weight loss known by PCP).         Current medications:  Anti-depressants and sleep medication.    Current occupation is professor.   Primary job tasks include:  Computer work.   Other job/home tasks details: everyday household tasks.                                  Objective:  System              Cervical/Thoracic Evaluation  Arom wnl cervical: repeated chin tucks equivocal - increase in lateral shoulder pain but pt thinks may be d/t sitting upright unsupported.     AROM:  AROM Cervical:    Flexion:            WNL  Extension:       WNL  Rotation:         Left: WNL     Right: WNL  Side Bend:      Left: mod loss     Right:  Mod loss                      Spinal Segmental Conclusions:  Negative Spurlings                 Shoulder Evaluation:  ROM:  AROM:    Flexion:  Left:  160    Right:  142    Abduction:  Left: 170   Right:  170    Internal Rotation:  Left:  T8    Right:  T12  External Rotation:  Left:  75    Right:  80                      Strength:  : pain FE, elbow extension   Flexion: Left:4+/5   Pain:    Right: 4/5      Pain:  +    Abduction:  Left: 4+/5  Pain:    Right: 4+/5     Pain:    Internal Rotation:  Left:5-/5     Pain:    Right: 5-/5     Pain:  External Rotation:   Left:4+/5     Pain:   Right:4/5     Pain:        Elbow Flexion:  Left:5-/5      Pain:    Right:5-/5     Pain:  Elbow Extension:  Left:5-/5     Pain:    Right:5-/5      Pain:+  Stability Testing:        Right shoulder stability negative testing:  Sulcus sign; Load and shift anterior and Load and shift posterior  Special Tests:      Right shoulder positive for the following special tests:Impingement (Negative HK, positive Neers)  Right shoulder negative for the following special tests:Labral; Rotator cuff tear and Acrimioclavicular  Palpation:      Right shoulder tenderness present at: Biceps; Supraspinatus; Infraspinatus and Bicipital Groove  Right shoulder tenderness not present at:Acrimioclavicular; Levator; Rhomboids or Upper Trap  Mobility Tests:  Mobility wnl shoulder: Normal scapulohumeral rhythm.                                                 General     ROS    Assessment/Plan:    Patient is a 66 year old female with right side shoulder complaints.    Patient has the following significant findings with corresponding treatment plan.                Diagnosis 1:  R shoulder pain  Pain -  hot/cold therapy, manual therapy, splint/taping/bracing/orthotics, self management, education, directional preference exercise and home program  Decreased ROM/flexibility - manual therapy, therapeutic exercise, therapeutic activity and home program  Decreased strength - therapeutic exercise, therapeutic activities and home program  Decreased function - therapeutic activities and home program    Therapy Evaluation Codes:   Cumulative Therapy Evaluation is: Low complexity.    Previous and current functional limitations:  (See Goal Flow Sheet for this information)    Short term and Long term goals: (See Goal Flow Sheet for this information)     Communication ability:  Patient appears to be able to clearly communicate and understand verbal and written communication and follow directions correctly.  Treatment Explanation - The following has been discussed with the patient:   RX ordered/plan of care  Anticipated  outcomes  Possible risks and side effects  This patient would benefit from PT intervention to resume normal activities.   Rehab potential is excellent.    Frequency:  1 X week, once daily  Duration:  for 8 weeks  Discharge Plan:  Achieve all LTG.  Independent in home treatment program.  Reach maximal therapeutic benefit.    Please refer to the daily flowsheet for treatment today, total treatment time and time spent performing 1:1 timed codes.

## 2022-08-05 ENCOUNTER — LAB (OUTPATIENT)
Dept: LAB | Facility: CLINIC | Age: 67
End: 2022-08-05
Payer: COMMERCIAL

## 2022-08-05 DIAGNOSIS — M25.552 PAIN OF BOTH HIP JOINTS: ICD-10-CM

## 2022-08-05 DIAGNOSIS — M85.89 OSTEOPENIA OF MULTIPLE SITES: ICD-10-CM

## 2022-08-05 DIAGNOSIS — M25.511 RIGHT ANTERIOR SHOULDER PAIN: ICD-10-CM

## 2022-08-05 DIAGNOSIS — Z13.220 SCREENING FOR HYPERLIPIDEMIA: ICD-10-CM

## 2022-08-05 DIAGNOSIS — M25.551 PAIN OF BOTH HIP JOINTS: ICD-10-CM

## 2022-08-05 DIAGNOSIS — Z11.59 NEED FOR HEPATITIS C SCREENING TEST: ICD-10-CM

## 2022-08-05 DIAGNOSIS — Z11.4 SCREENING FOR HIV (HUMAN IMMUNODEFICIENCY VIRUS): ICD-10-CM

## 2022-08-05 LAB
CHOLEST SERPL-MCNC: 290 MG/DL
DEPRECATED CALCIDIOL+CALCIFEROL SERPL-MC: 57 UG/L (ref 20–75)
FASTING STATUS PATIENT QL REPORTED: YES
HCV AB SERPL QL IA: NONREACTIVE
HDLC SERPL-MCNC: 89 MG/DL
HIV 1+2 AB+HIV1 P24 AG SERPL QL IA: NONREACTIVE
LDLC SERPL CALC-MCNC: 184 MG/DL
NONHDLC SERPL-MCNC: 201 MG/DL
TRIGL SERPL-MCNC: 86 MG/DL
VIT B12 SERPL-MCNC: 708 PG/ML (ref 193–986)

## 2022-08-05 PROCEDURE — 80061 LIPID PANEL: CPT | Performed by: PATHOLOGY

## 2022-08-05 PROCEDURE — 87389 HIV-1 AG W/HIV-1&-2 AB AG IA: CPT | Performed by: FAMILY MEDICINE

## 2022-08-05 PROCEDURE — 36415 COLL VENOUS BLD VENIPUNCTURE: CPT | Performed by: PATHOLOGY

## 2022-08-05 PROCEDURE — 82607 VITAMIN B-12: CPT | Performed by: PATHOLOGY

## 2022-08-05 PROCEDURE — 86803 HEPATITIS C AB TEST: CPT | Performed by: FAMILY MEDICINE

## 2022-08-05 PROCEDURE — 82306 VITAMIN D 25 HYDROXY: CPT | Performed by: FAMILY MEDICINE

## 2022-08-08 ENCOUNTER — ANCILLARY PROCEDURE (OUTPATIENT)
Dept: GENERAL RADIOLOGY | Facility: CLINIC | Age: 67
End: 2022-08-08
Attending: FAMILY MEDICINE
Payer: COMMERCIAL

## 2022-08-08 ENCOUNTER — OFFICE VISIT (OUTPATIENT)
Dept: FAMILY MEDICINE | Facility: CLINIC | Age: 67
End: 2022-08-08

## 2022-08-08 VITALS
DIASTOLIC BLOOD PRESSURE: 83 MMHG | SYSTOLIC BLOOD PRESSURE: 145 MMHG | BODY MASS INDEX: 18.97 KG/M2 | HEART RATE: 81 BPM | TEMPERATURE: 98.3 F | WEIGHT: 106.38 LBS

## 2022-08-08 DIAGNOSIS — M53.3 SI (SACROILIAC) JOINT DYSFUNCTION: ICD-10-CM

## 2022-08-08 DIAGNOSIS — D17.30 LIPOMA OF SKIN AND SUBCUTANEOUS TISSUE: ICD-10-CM

## 2022-08-08 DIAGNOSIS — M25.551 BILATERAL HIP PAIN: ICD-10-CM

## 2022-08-08 DIAGNOSIS — M25.552 BILATERAL HIP PAIN: ICD-10-CM

## 2022-08-08 DIAGNOSIS — M18.11 ARTHRITIS OF CARPOMETACARPAL (CMC) JOINT OF RIGHT THUMB: Primary | ICD-10-CM

## 2022-08-08 DIAGNOSIS — M25.531 RIGHT WRIST PAIN: ICD-10-CM

## 2022-08-08 PROCEDURE — 99214 OFFICE O/P EST MOD 30 MIN: CPT | Performed by: FAMILY MEDICINE

## 2022-08-08 PROCEDURE — 73110 X-RAY EXAM OF WRIST: CPT | Mod: TC | Performed by: RADIOLOGY

## 2022-08-08 NOTE — PROGRESS NOTES
Assessment & Plan     Right wrist pain secondary to moderate degenerative joint of first CMC.  Plan: xray shows- moderate degenerative changes of 1st CMC  -   Treatment plan- is for symptoms.  Advised to  use over the counter ibuprofen 400-600 mg  as needed , not more than bis as needed  For next few day.    Use wrist/ thumb spica brace.    Continue with PHYSICAL THERAPY.    Orthopedic consultation- for IA steroid injection for worsening pain or unresolved pain.  - XR Wrist Right G/E 3 Views; Future    Bilateral hip pain  Plan: - XR Pelvis and Hip Right 1 View; completed and consistent with mild degenerative changes of bilateral SI joint and  Pubic symphysis.  Take ibuprofen as needed  With meals for moderate pain-   Continue with PHYSICAL THERAPY.  Follow up as needed      Lipoma of skin and subcutaneous tissue  Plan: upper lateral thigh - palpable lump- about 3 X 3 inch  In size- anterolateral location  I do recommend consultation- to get it excised because of referred discomfort and size.    - Adult General Surg Referral; Future    Ordering of each unique test  I spent a total of 32 minutes on the day of the visit.   Time spent doing chart review, history and exam, documentation and further activities per the note        No follow-ups on file.   Follow-up Visit   Expected date:  Sep 08, 2022 (Approximate)      Follow Up Appointment Details:     Follow-up with whom?: Me    Follow-Up for what?: Other (Office Visit)    How?: In Person                    Spring Fletcher MD  [facility]    Lori Houston is a 66 year old accompanied by her self, presenting for the following health issues:  Musculoskeletal Problem (Right shoulder and hip pain)      Musculoskeletal Problem  This is a new problem. The current episode started 1 to 4 weeks ago. The problem occurs constantly. The problem has been unchanged. The symptoms are aggravated by bending and walking. She has tried heat for the symptoms.  The treatment provided mild relief.      1. Wants imaging of the wrist- because PHYSICAL THERAPY asked.  Has to wear the thumb brace and it helps with base of the thumb pain- but does not wear the brace all the time- because it inhbitis ROM.  2. Right shoulder pain , mild.  Hip pain, mostly after strenuous activities.  No morning stiffness.  3. Noted a lump on the thigh- seems to be getting bigger.wondering if the lump is contributing to the pain      Review of Systems   Constitutional, HEENT, cardiovascular, pulmonary, GI, , musculoskeletal, neuro, skin, endocrine and psych systems are negative, except as otherwise noted.      Objective    BP (!) 145/83   Pulse 81   Temp 98.3  F (36.8  C) (Oral)   Wt 48.3 kg (106 lb 6 oz)   LMP  (LMP Unknown)   BMI 18.97 kg/m    Body mass index is 18.97 kg/m .  Physical Exam   GENERAL: healthy, alert and no distress  NECK: no adenopathy and no asymmetry, masses, or scars  RESP: lungs clear to auscultation - no rales, rhonchi or wheezes  CV: regular rates and rhythm  SKIN: easily palpable, subcutaneous lipoma palpable on right upper anteriolateral thigh about 3X3 inch in diameter- non tender.  Wrist exam- show tenderness over 1st CMC joint.  Hip exam: full ROM.mild tenderness over the SI joint. Rest of the exam- well within normal limits   Results for orders placed or performed in visit on 08/08/22   XR Pelvis and Hip Right 1 View     Status: None    Narrative    XR PELVIS AND HIP RIGHT 1 VIEW 8/8/2022 11:54 AM     HISTORY: Bilateral hip pain; Bilateral hip pain    COMPARISON: None.       Impression    IMPRESSION: Right hip joint space is maintained. No fracture or  dislocation. Mild degenerative changes of the pubic symphysis and  bilateral SI joints.    OLGA LARA MD         SYSTEM ID:  [id number]   Results for orders placed or performed in visit on 08/08/22   XR Wrist Right G/E 3 Views     Status: None    Narrative    XR WRIST RIGHT G/E 3 VIEWS 8/8/2022 11:53 AM      HISTORY: Right wrist pain    COMPARISON: None.       Impression    IMPRESSION: Moderate degenerative changes first CMC joint. No evidence  of an acute fracture. There is a osteocartilaginous body along the  periphery of the first CMC joint.    OLGA LARA MD         SYSTEM ID:  [id number]               .  ..

## 2022-08-09 NOTE — PROGRESS NOTES
"INITIAL NEUROLOGY CONSULTATION    DATE OF VISIT: 8/10/2022  CLINIC LOCATION: [facility]  MRN: [id number]  PATIENT NAME: Celso Cates  YOB: 1955    REASON FOR VISIT: No chief complaint on file.    HISTORY OF PRESENT ILLNESS:                                                    Ms. Celso Cates is 66 year old right handed female patient with past medical history of squamous cell carcinoma of the hand and anxiety, who was seen today for right lower extremity paresthesia.    Per patient's report, right ankle numbness started approximately 2 to 3 weeks ago.  It affects her ability to walk, making her unsteady.  It feels that her right foot/ankle \"is asleep\".  It is worse after exercise, especially ankle flexion.  Denies any additional focal neurological symptoms, but reports chronic indigestion, weight loss, and sensation of restless legs at bedtime.    Family history is positive for stroke, multiple sclerosis, and peripheral neuropathy.    Laboratory evaluation from July-August 2022 includes elevated LDL of 184, non-reactive hep C/HIV serology, normal vitamin B12 (708), vitamin D (57), and TSH (0.79).    No prior brain or spine imaging.    No additional useful information is available in Care Everywhere, which was reviewed.  PAST MEDICAL/SURGICAL HISTORY:                                                    I personally reviewed patient's past medical and surgical history with the patient at today's visit.  MEDICATIONS:                                                    I personally reviewed patient's medications and allergies with the patient at today's visit.  ALLERGIES:                                                    No Known Allergies  EXAM:                                                    VITAL SIGNS:   LMP  (LMP Unknown)   Mini-Cog Assessment:       General: pt is in NAD, cooperative.  Skin: normal turgor, moist mucous membranes, no lesions/rashes noticed.  HEENT: ATNC, EOMI, " PERRL, white sclera, normal conjunctiva, no nystagmus or ptosis. No carotid bruits bilaterally.  Respiratory: lung sounds clear to auscultation bilaterally, no crackles, wheezes, rhonchi. Symmetric lung excursion, no accessory respiratory muscle use.  Cardiovascular: normal S1/S2, no murmurs/rubs/gallops.   Abdomen: Not distended.  : deferred.    Neurological:  Mental: alert, follows commands,  /5 with ***/3 on memory recall, no aphasia or dysarthria. Fund of knowledge is {MYAPPROPRIATE:314609}  Cranial Nerves:  CN II: visual acuity - able to accurately count fingers with each eye. Visual fields intact, fundi: discs sharp, no papilledema and normal vessels bilaterally.  CN III, IV, VI: EOM intact, pupils equal and reactive  CN V: facial sensation nl  CN VII: face symmetric, no facial droop  CN VIII: hearing normal  CN IX: palate elevation symmetric, uvula at midline  CN XI SCM normal, shoulder shrug nl  CN XII: tongue midline  Motor: Strength: 5/5 in all major groups of all extremities. Normal tone. No abnormal movements. No pronator drift b/l.  Reflexes: Triceps, biceps, brachioradialis, patellar, and achilles reflexes normal and symmetric. No clonus noted. Toes are down-going b/l.   Sensory: light touch, pinprick, and vibration intact. Romberg: negative.  Coordination: FNF and heel-shin tests intact b/l.   Gait:  Normal, able to tandem walk *** without difficulty.  DATA:   LABS/EEG/IMAGING/OTHER STUDIES: I reviewed pertinent medical records, as detailed in the history of present illness.  ASSESSMENT AND PLAN:      ASSESSMENT: Celso Cates is a 66 year old female patient with listed above past medical history, who presents with ***.    We had a detailed discussion with the patient regarding her presenting complaints.  The neurological exam today is ***.    DIAGNOSES:  No diagnosis found.  PLAN: There are no Patient Instructions on file for this visit.    Total Time: *** minutes spent on the date of the encounter  doing chart review, history and exam, documentation and further activities per the note.    Savage Mann MD  [facility] Neurology  (Chart documentation was completed in part with Dragon voice-recognition software. Even though reviewed, some grammatical, spelling, and word errors may remain.)

## 2022-08-10 ENCOUNTER — OFFICE VISIT (OUTPATIENT)
Dept: NEUROLOGY | Facility: CLINIC | Age: 67
End: 2022-08-10
Payer: COMMERCIAL

## 2022-08-10 ENCOUNTER — TRANSFERRED RECORDS (OUTPATIENT)
Dept: HEALTH INFORMATION MANAGEMENT | Facility: CLINIC | Age: 67
End: 2022-08-10

## 2022-08-10 ENCOUNTER — MYC MEDICAL ADVICE (OUTPATIENT)
Dept: FAMILY MEDICINE | Facility: CLINIC | Age: 67
End: 2022-08-10

## 2022-08-10 VITALS
WEIGHT: 106 LBS | SYSTOLIC BLOOD PRESSURE: 118 MMHG | OXYGEN SATURATION: 100 % | HEIGHT: 63 IN | HEART RATE: 83 BPM | BODY MASS INDEX: 18.78 KG/M2 | DIASTOLIC BLOOD PRESSURE: 81 MMHG

## 2022-08-10 DIAGNOSIS — R20.0 NUMBNESS OF RIGHT LOWER EXTREMITY: Primary | ICD-10-CM

## 2022-08-10 PROCEDURE — 99205 OFFICE O/P NEW HI 60 MIN: CPT | Performed by: PSYCHIATRY & NEUROLOGY

## 2022-08-10 NOTE — PROGRESS NOTES
"  Celso Cates is a 66 year old female who presents for:  Chief Complaint   Patient presents with     Consult     Patient is having numbness on the outside of right shin especially when walking when the numbness starts she feels a bit off balance. I having pain in right shoulder that radiates down back of arm, sometimes fingers are stiff and lock         Initial Vitals:  /81 (BP Location: Left arm, Patient Position: Sitting, Cuff Size: Adult Small)   Pulse 83   Ht 1.6 m (5' 3\")   Wt 48.1 kg (106 lb)   LMP  (LMP Unknown)   SpO2 100%   BMI 18.78 kg/m   Estimated body mass index is 18.78 kg/m  as calculated from the following:    Height as of this encounter: 1.6 m (5' 3\").    Weight as of this encounter: 48.1 kg (106 lb).. Body surface area is 1.46 meters squared. BP completed using cuff size: small harshad Jeffrey  "

## 2022-08-10 NOTE — LETTER
"    8/10/2022         RE: Celso Cates  [address]  [place] [address]        Dear Colleague,    Thank you for referring your patient, Celso Cates, to the [facility]. Please see a copy of my visit note below.    INITIAL NEUROLOGY CONSULTATION    DATE OF VISIT: 8/10/2022  CLINIC LOCATION: [facility]  MRN: [id number]  PATIENT NAME: Celso Cates  YOB: 1955    REASON FOR VISIT:   Chief Complaint   Patient presents with     Consult     Patient is having numbness on the outside of right shin especially when walking when the numbness starts she feels a bit off balance. I having pain in right shoulder that radiates down back of arm, sometimes fingers are stiff and lock      HISTORY OF PRESENT ILLNESS:                                                    Ms. Celso Cates is 66 year old right handed female patient with past medical history of squamous cell carcinoma of the hand and anxiety, who was seen today for right lower extremity paresthesia.    Per patient's report, intermittent right lateral shin numbness started approximately 2 to 3 weeks ago.  It affects her ability to walk, making her unsteady.  It feels that her right foot/ankle \"is asleep\".  It is worse after exercise, especially ankle flexion.  Reports intermittent bilateral hip pain and low back pain.  She is also having pain in the right shoulder that radiates down the arm.  Occasionally her fingers feel stiff and lock up.  She denies any additional focal neurological symptoms, but reports chronic indigestion, weight loss (15# since December 2021), and chronic involuntary feet movements at bedtime (non-concerning to the patient currently).    Family history is positive for stroke, multiple sclerosis, and peripheral neuropathy.    Laboratory evaluation from July-August 2022 includes elevated LDL of 184, non-reactive hep C/HIV serology, normal vitamin B12 (708), vitamin D (57), and TSH " "(0.79).    No prior brain or spine imaging.    No additional useful information is available in Care Everywhere, which was reviewed.  PAST MEDICAL/SURGICAL HISTORY:                                                    I personally reviewed patient's past medical and surgical history with the patient at today's visit.  MEDICATIONS:                                                    I personally reviewed patient's medications and allergies with the patient at today's visit.  ALLERGIES:                                                    No Known Allergies  EXAM:                                                    VITAL SIGNS:   /81 (BP Location: Left arm, Patient Position: Sitting, Cuff Size: Adult Small)   Pulse 83   Ht 1.6 m (5' 3\")   LMP  (LMP Unknown)   SpO2 100%   BMI 18.84 kg/m    Mini-Cog Assessment:  Mini Cog Assessment  Clock Draw Score: 2 Normal  3 Item Recall: 3 objects recalled  Mini Cog Total Score: 5  Administered by: : Xin VINCENT    General: pt is in NAD, cooperative.  Skin: normal turgor, moist mucous membranes, no lesions/rashes noticed.  HEENT: ATNC, EOMI, PERRL, white sclera, normal conjunctiva, no nystagmus or ptosis. No carotid bruits bilaterally.  Respiratory: lung sounds clear to auscultation bilaterally, no crackles, wheezes, rhonchi. Symmetric lung excursion, no accessory respiratory muscle use.  Cardiovascular: normal S1/S2, no murmurs/rubs/gallops.   Abdomen: Not distended.  : deferred.    Neurological:  Mental: alert, follows commands, Mini Cog Total Score: 5/5 with 3/3 on memory recall, no aphasia or dysarthria. Fund of knowledge is appropriate for age.  Cranial Nerves:  CN II: visual acuity - able to accurately count fingers with each eye. Visual fields intact, fundi: discs sharp, no papilledema and normal vessels bilaterally.  CN III, IV, VI: EOM intact, pupils equal and reactive  CN V: facial sensation nl  CN VII: face symmetric, no facial droop  CN VIII: hearing normal  CN IX: " palate elevation symmetric, uvula at midline  CN XI SCM normal, shoulder shrug nl  CN XII: tongue midline  Motor: Strength: 5/5 in all major groups of all extremities. Normal tone. No abnormal movements. No pronator drift b/l.  Reflexes: Triceps, biceps, brachioradialis, patellar, and achilles reflexes normal and symmetric. No clonus noted. Toes are down-going b/l.   Sensory: light touch, pinprick, and vibration intact. Romberg: negative.  Coordination: FNF and heel-shin tests intact b/l.   Gait:  Normal, able to tandem walk without difficulty.  DATA:   LABS/EEG/IMAGING/OTHER STUDIES: I reviewed pertinent medical records, as detailed in the history of present illness.  ASSESSMENT AND PLAN:      ASSESSMENT: Celso Cates is a 66 year old female patient with listed above past medical history, who presents with intermittent right lower extremity numbness for the last several weeks.    We had a detailed discussion with the patient regarding her presenting complaints.  The neurological exam today is non-focal.  We discussed that her presentation might be consistent with common/superficial peroneal neuropathy, sciatic neuropathy, lumbosacral plexopathy, and potentially L5 radiculopathy.  For further diagnostic clarification I ordered EMG and lumbar spine MRI.    DIAGNOSES:    ICD-10-CM    1. Numbness of right lower extremity  R20.0 EMG     MR Lumbar Spine w/o Contrast     PLAN: At today's visit we thoroughly discussed various diagnostic possibilities for patient's symptoms, necessary evaluation, and the plan, which includes:  Orders Placed This Encounter   Procedures     MR Lumbar Spine w/o Contrast     EMG     No new medications.     Additional recommendations after the work-up.    Next follow-up appointment is in the next 4 weeks or earlier if needed.    Total Time: 61 minutes spent on the date of the encounter doing chart review, history and exam, documentation and further activities per the note.    Savage CARL  "MD Johnathan  [facility] Neurology  (Chart documentation was completed in part with Dragon voice-recognition software. Even though reviewed, some grammatical, spelling, and word errors may remain.)            Celso Cates is a 66 year old female who presents for:  Chief Complaint   Patient presents with     Consult     Patient is having numbness on the outside of right shin especially when walking when the numbness starts she feels a bit off balance. I having pain in right shoulder that radiates down back of arm, sometimes fingers are stiff and lock         Initial Vitals:  /81 (BP Location: Left arm, Patient Position: Sitting, Cuff Size: Adult Small)   Pulse 83   Ht 1.6 m (5' 3\")   Wt 48.1 kg (106 lb)   LMP  (LMP Unknown)   SpO2 100%   BMI 18.78 kg/m   Estimated body mass index is 18.78 kg/m  as calculated from the following:    Height as of this encounter: 1.6 m (5' 3\").    Weight as of this encounter: 48.1 kg (106 lb).. Body surface area is 1.46 meters squared. BP completed using cuff size: small regular        Xin Jeffrey      Again, thank you for allowing me to participate in the care of your patient.        Sincerely,        Savage Mann MD    "

## 2022-08-10 NOTE — PROGRESS NOTES
"INITIAL NEUROLOGY CONSULTATION    DATE OF VISIT: 8/10/2022  CLINIC LOCATION: [facility]  MRN: [id number]  PATIENT NAME: Celso Cates  YOB: 1955    REASON FOR VISIT:   Chief Complaint   Patient presents with     Consult     Patient is having numbness on the outside of right shin especially when walking when the numbness starts she feels a bit off balance. I having pain in right shoulder that radiates down back of arm, sometimes fingers are stiff and lock      HISTORY OF PRESENT ILLNESS:                                                    Ms. Celso Cates is 66 year old right handed female patient with past medical history of squamous cell carcinoma of the hand and anxiety, who was seen today for right lower extremity paresthesia.    Per patient's report, intermittent right lateral shin numbness started approximately 2 to 3 weeks ago.  It affects her ability to walk, making her unsteady.  It feels that her right foot/ankle \"is asleep\".  It is worse after exercise, especially ankle flexion.  Reports intermittent bilateral hip pain and low back pain.  She is also having pain in the right shoulder that radiates down the arm.  Occasionally her fingers feel stiff and lock up.  She denies any additional focal neurological symptoms, but reports chronic indigestion, weight loss (15# since December 2021), and chronic involuntary feet movements at bedtime (non-concerning to the patient currently).    Family history is positive for stroke, multiple sclerosis, and peripheral neuropathy.    Laboratory evaluation from July-August 2022 includes elevated LDL of 184, non-reactive hep C/HIV serology, normal vitamin B12 (708), vitamin D (57), and TSH (0.79).    No prior brain or spine imaging.    No additional useful information is available in Care Everywhere, which was reviewed.  PAST MEDICAL/SURGICAL HISTORY:                                                    I personally reviewed patient's past " "medical and surgical history with the patient at today's visit.  MEDICATIONS:                                                    I personally reviewed patient's medications and allergies with the patient at today's visit.  ALLERGIES:                                                    No Known Allergies  EXAM:                                                    VITAL SIGNS:   /81 (BP Location: Left arm, Patient Position: Sitting, Cuff Size: Adult Small)   Pulse 83   Ht 1.6 m (5' 3\")   LMP  (LMP Unknown)   SpO2 100%   BMI 18.84 kg/m    Mini-Cog Assessment:  Mini Cog Assessment  Clock Draw Score: 2 Normal  3 Item Recall: 3 objects recalled  Mini Cog Total Score: 5  Administered by: : Xin VINCENT    General: pt is in NAD, cooperative.  Skin: normal turgor, moist mucous membranes, no lesions/rashes noticed.  HEENT: ATNC, EOMI, PERRL, white sclera, normal conjunctiva, no nystagmus or ptosis. No carotid bruits bilaterally.  Respiratory: lung sounds clear to auscultation bilaterally, no crackles, wheezes, rhonchi. Symmetric lung excursion, no accessory respiratory muscle use.  Cardiovascular: normal S1/S2, no murmurs/rubs/gallops.   Abdomen: Not distended.  : deferred.    Neurological:  Mental: alert, follows commands, Mini Cog Total Score: 5/5 with 3/3 on memory recall, no aphasia or dysarthria. Fund of knowledge is appropriate for age.  Cranial Nerves:  CN II: visual acuity - able to accurately count fingers with each eye. Visual fields intact, fundi: discs sharp, no papilledema and normal vessels bilaterally.  CN III, IV, VI: EOM intact, pupils equal and reactive  CN V: facial sensation nl  CN VII: face symmetric, no facial droop  CN VIII: hearing normal  CN IX: palate elevation symmetric, uvula at midline  CN XI SCM normal, shoulder shrug nl  CN XII: tongue midline  Motor: Strength: 5/5 in all major groups of all extremities. Normal tone. No abnormal movements. No pronator drift b/l.  Reflexes: Triceps, biceps, " brachioradialis, patellar, and achilles reflexes normal and symmetric. No clonus noted. Toes are down-going b/l.   Sensory: light touch, pinprick, and vibration intact. Romberg: negative.  Coordination: FNF and heel-shin tests intact b/l.   Gait:  Normal, able to tandem walk without difficulty.  DATA:   LABS/EEG/IMAGING/OTHER STUDIES: I reviewed pertinent medical records, as detailed in the history of present illness.  ASSESSMENT AND PLAN:      ASSESSMENT: Celso Cates is a 66 year old female patient with listed above past medical history, who presents with intermittent right lower extremity numbness for the last several weeks.    We had a detailed discussion with the patient regarding her presenting complaints.  The neurological exam today is non-focal.  We discussed that her presentation might be consistent with common/superficial peroneal neuropathy, sciatic neuropathy, lumbosacral plexopathy, and potentially L5 radiculopathy.  For further diagnostic clarification I ordered EMG and lumbar spine MRI.    DIAGNOSES:    ICD-10-CM    1. Numbness of right lower extremity  R20.0 EMG     MR Lumbar Spine w/o Contrast     PLAN: At today's visit we thoroughly discussed various diagnostic possibilities for patient's symptoms, necessary evaluation, and the plan, which includes:  Orders Placed This Encounter   Procedures     MR Lumbar Spine w/o Contrast     EMG     No new medications.     Additional recommendations after the work-up.    Next follow-up appointment is in the next 4 weeks or earlier if needed.    Total Time: 61 minutes spent on the date of the encounter doing chart review, history and exam, documentation and further activities per the note.    Savage Mann MD  [facility]  (Chart documentation was completed in part with Dragon voice-recognition software. Even though reviewed, some grammatical, spelling, and word errors may remain.)

## 2022-08-10 NOTE — PATIENT INSTRUCTIONS
AFTER VISIT SUMMARY (AVS):    At today's visit we thoroughly discussed various diagnostic possibilities for your symptoms, necessary evaluation, and the plan, which includes:  Orders Placed This Encounter   Procedures    MR Lumbar Spine w/o Contrast    EMG     No new medications.     Additional recommendations after the work-up.    Next follow-up appointment is in the next 4 weeks or earlier if needed.    Please do not hesitate to call me with any questions or concerns.    Thanks.

## 2022-08-12 PROBLEM — D17.30 LIPOMA OF SKIN AND SUBCUTANEOUS TISSUE: Status: ACTIVE | Noted: 2022-08-12

## 2022-08-12 PROBLEM — M18.11 ARTHRITIS OF CARPOMETACARPAL (CMC) JOINT OF RIGHT THUMB: Status: ACTIVE | Noted: 2022-08-12

## 2022-08-12 PROBLEM — M53.3 SI (SACROILIAC) JOINT DYSFUNCTION: Status: ACTIVE | Noted: 2022-08-12

## 2022-08-15 ENCOUNTER — ANCILLARY PROCEDURE (OUTPATIENT)
Dept: MRI IMAGING | Facility: CLINIC | Age: 67
End: 2022-08-15
Attending: PSYCHIATRY & NEUROLOGY
Payer: COMMERCIAL

## 2022-08-15 DIAGNOSIS — R20.0 NUMBNESS OF RIGHT LOWER EXTREMITY: ICD-10-CM

## 2022-08-15 PROCEDURE — 72148 MRI LUMBAR SPINE W/O DYE: CPT | Performed by: RADIOLOGY

## 2022-08-25 NOTE — TELEPHONE ENCOUNTER
DIAGNOSIS: arthritis of carpomtarcarpal joint of R thumb/Dr. Ontiveros/XR/BCBS/ortho con   APPOINTMENT DATE: 8.30.22   NOTES STATUS DETAILS   OFFICE NOTE from referring provider Internal 8.8.22 DAWIT Fletcher [place]   OFFICE NOTE from other specialist Internal 8.2.22, 7.25.22, 7.20.22, 7.6.22 CAIT Gibbs, DAWIT  6.9.22 DWAIT Chun   DISCHARGE SUMMARY from hospital     DISCHARGE REPORT from the ER     OPERATIVE REPORT     EMG report     MEDICATION LIST     MRI     DEXA (osteoporosis/bone health)     CT SCAN     XRAYS (IMAGES & REPORTS) Internal 8.8.22 R wrist

## 2022-08-30 ENCOUNTER — OFFICE VISIT (OUTPATIENT)
Dept: ORTHOPEDICS | Facility: CLINIC | Age: 67
End: 2022-08-30
Payer: COMMERCIAL

## 2022-08-30 ENCOUNTER — PRE VISIT (OUTPATIENT)
Dept: ORTHOPEDICS | Facility: CLINIC | Age: 67
End: 2022-08-30

## 2022-08-30 DIAGNOSIS — M18.11 ARTHRITIS OF CARPOMETACARPAL (CMC) JOINT OF RIGHT THUMB: ICD-10-CM

## 2022-08-30 DIAGNOSIS — S99.921A INJURY OF TOE ON RIGHT FOOT, INITIAL ENCOUNTER: ICD-10-CM

## 2022-08-30 DIAGNOSIS — M65.341 TRIGGER RING FINGER OF RIGHT HAND: Primary | ICD-10-CM

## 2022-08-30 PROCEDURE — 99203 OFFICE O/P NEW LOW 30 MIN: CPT | Performed by: FAMILY MEDICINE

## 2022-08-30 NOTE — PROGRESS NOTES
[facility]  SPORTS & ORTHOPEDIC CLINIC VISIT     Aug 30, 2022        ASSESSMENT & PLAN    Right first CMC osteoarthritis  Right ring finger trigger finger  Suspected right second toe occult fracture      Reviewed imaging and assessment with patient in detail  Discussed options for treatment for the osteoarthritis and trigger finger.  She is provided with an oval 8 splint and recommended a period of night splinting for the trigger finger.  She also has a OTC brace that she can use for the CMC joint.  May consider hand therapy referral for custom brace as well as treatment for the CMC arthritis as well.  We also discussed the indication for steroid injection for both the trigger finger as well as the CMC arthritis and she will follow-up with me if she would like to pursue these options.  We discussed her suspected right toe occult fracture and treatment as necessary.  We discussed footwear modifications as well as buddy taping.  Okay to increase activity as tolerated.  Follow-up as needed.    Albin Ko MD  [facility] SP[facility]TS [facility]IC[facility]    -----  Chief Complaint   Patient presents with     Right Hand - Pain       SUBJECTIVE  Celso Cates is a/an 66 year old female who is seen in consultation at the request of  Spring Fletcher M.D. for evaluation of right hand pain.     The patient is seen by themselves.  The patient is Right handed    Onset: June 2022. Patient describes injury as using her thumb to dig in and push on her trigger point injections.  Location of Pain: right thumb  Worsened by: gripping, use of hands   Better with: NA   Treatments tried: hand therapy, brace helps but finds it too cumbersome  Associated symptoms: swelling    She additionally stubbed her second toe 10 days ago.  Had significant pain and was seen in urgent care where reportedly no fracture was seen.  She has been buddy taping and feels that the symptoms have been improving slightly but  she still has significant amount of pain with any pressure to the second toe.    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 No    Do you have any vision problems? No    Do you have any chest pain or edema? No    Do you have any shortness of breath or wheezing?  No    Do you have stomach problems? No    Do you have any numbness or focal weakness? No    Do you have diabetes? No    Do you have problems with bleeding or clotting? No    Do you have an rashes or other skin lesions? No    OBJECTIVE:  LMP  (LMP Unknown)      Right hand: Warm and well-perfused.  There is slight deformity about the CMC joint with prominence over the radial aspect.  Is mildly tender in this area to palpation.  Has pain with CMC grind as well as gripping.  Strength is intact.  She additionally has triggering with passive flexion and extension of the PIP joint of the right ring finger.  Mildly tender to palpation over the volar aspect of the metacarpal head.  Right foot: Resolving ecchymosis over the dorsal aspect of the second and third toes at the MTP joints.  Significant tenderness to palpation over the proximal aspect of the proximal phalanx of the second toe.  No tenderness to palpation of the metatarsals or remaining phalanges.  Full range of motion at the MTP joints and is able to flex and extend toes against resistance.  Cap refill brisk.      RADIOLOGY:    Reviewed previous imaging of the right wrist dated 8/8/2022.  Per independent review this demonstrates at least moderate DJD of the first CMC with calcified body along the CMC joint.  See EMR for formal radiology report.

## 2022-08-30 NOTE — LETTER
8/30/2022      RE: Celso Cates  [address]  [place] [address]     Dear Colleague,    Thank you for referring your patient, Celso Cates, to the [facility] SPORTS MEDICINE [facility]. Please see a copy of my visit note below.      [facility]  SPORTS & ORTHOPEDIC CLINIC VISIT     Aug 30, 2022        ASSESSMENT & PLAN    Right first CMC osteoarthritis  Right ring finger trigger finger  Suspected right second toe occult fracture      Reviewed imaging and assessment with patient in detail  Discussed options for treatment for the osteoarthritis and trigger finger.  She is provided with an oval 8 splint and recommended a period of night splinting for the trigger finger.  She also has a OTC brace that she can use for the CMC joint.  May consider hand therapy referral for custom brace as well as treatment for the CMC arthritis as well.  We also discussed the indication for steroid injection for both the trigger finger as well as the CMC arthritis and she will follow-up with me if she would like to pursue these options.  We discussed her suspected right toe occult fracture and treatment as necessary.  We discussed footwear modifications as well as buddy taping.  Okay to increase activity as tolerated.  Follow-up as needed.    Albin Ko MD  [facility] SPORTS MEDIC[facility]    -----  Chief Complaint   Patient presents with     Right Hand - Pain       SUBJECTIVE  Celso Cates is a/an 66 year old female who is seen in consultation at the request of  Spring Fletcher M.D. for evaluation of right hand pain.     The patient is seen by themselves.  The patient is Right handed    Onset: June 2022. Patient describes injury as using her thumb to dig in and push on her trigger point injections.  Location of Pain: right thumb  Worsened by: gripping, use of hands   Better with: NA   Treatments tried: hand therapy, brace helps but finds it too  cumbersome  Associated symptoms: swelling    She additionally stubbed her second toe 10 days ago.  Had significant pain and was seen in urgent care where reportedly no fracture was seen.  She has been eladio taping and feels that the symptoms have been improving slightly but she still has significant amount of pain with any pressure to the second toe.    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 No    Do you have any vision problems? No    Do you have any chest pain or edema? No    Do you have any shortness of breath or wheezing?  No    Do you have stomach problems? No    Do you have any numbness or focal weakness? No    Do you have diabetes? No    Do you have problems with bleeding or clotting? No    Do you have an rashes or other skin lesions? No    OBJECTIVE:  LMP  (LMP Unknown)      Right hand: Warm and well-perfused.  There is slight deformity about the CMC joint with prominence over the radial aspect.  Is mildly tender in this area to palpation.  Has pain with CMC grind as well as gripping.  Strength is intact.  She additionally has triggering with passive flexion and extension of the PIP joint of the right ring finger.  Mildly tender to palpation over the volar aspect of the metacarpal head.  Right foot: Resolving ecchymosis over the dorsal aspect of the second and third toes at the MTP joints.  Significant tenderness to palpation over the proximal aspect of the proximal phalanx of the second toe.  No tenderness to palpation of the metatarsals or remaining phalanges.  Full range of motion at the MTP joints and is able to flex and extend toes against resistance.  Cap refill brisk.      RADIOLOGY:    Reviewed previous imaging of the right wrist dated 8/8/2022.  Per independent review this demonstrates at least moderate DJD of the first CMC with calcified body along the CMC joint.  See EMR for formal  radiology report.      Again, thank you for allowing me to participate in the care of your patient.      Sincerely,    Albin Ko MD

## 2022-09-05 ASSESSMENT — ENCOUNTER SYMPTOMS
DEPRESSION: 1
DIZZINESS: 0
DIARRHEA: 0
LOSS OF CONSCIOUSNESS: 0
WEIGHT LOSS: 1
STIFFNESS: 1
MUSCLE WEAKNESS: 1
DECREASED APPETITE: 1
PANIC: 1
NIGHT SWEATS: 0
JAUNDICE: 0
ABDOMINAL PAIN: 0
WEIGHT GAIN: 0
POLYDIPSIA: 0
JOINT SWELLING: 1
MEMORY LOSS: 0
BOWEL INCONTINENCE: 0
FEVER: 0
MUSCLE CRAMPS: 1
ARTHRALGIAS: 1
NERVOUS/ANXIOUS: 1
SPEECH CHANGE: 0
WEAKNESS: 1
PARALYSIS: 0
BLOATING: 0
HEADACHES: 0
RECTAL PAIN: 0
BACK PAIN: 1
VOMITING: 0
NAUSEA: 0
ALTERED TEMPERATURE REGULATION: 0
INCREASED ENERGY: 0
CHILLS: 0
FATIGUE: 0
MYALGIAS: 1
TREMORS: 0
NUMBNESS: 1
POLYPHAGIA: 0
NECK PAIN: 1
SEIZURES: 0
HEARTBURN: 1
HALLUCINATIONS: 0
CONSTIPATION: 0
INSOMNIA: 1
DISTURBANCES IN COORDINATION: 0
DECREASED CONCENTRATION: 1
BLOOD IN STOOL: 0
TINGLING: 1

## 2022-09-05 ASSESSMENT — ANXIETY QUESTIONNAIRES
1. FEELING NERVOUS, ANXIOUS, OR ON EDGE: NEARLY EVERY DAY
IF YOU CHECKED OFF ANY PROBLEMS ON THIS QUESTIONNAIRE, HOW DIFFICULT HAVE THESE PROBLEMS MADE IT FOR YOU TO DO YOUR WORK, TAKE CARE OF THINGS AT HOME, OR GET ALONG WITH OTHER PEOPLE: VERY DIFFICULT
6. BECOMING EASILY ANNOYED OR IRRITABLE: NOT AT ALL
GAD7 TOTAL SCORE: 11
5. BEING SO RESTLESS THAT IT IS HARD TO SIT STILL: NOT AT ALL
3. WORRYING TOO MUCH ABOUT DIFFERENT THINGS: NEARLY EVERY DAY
4. TROUBLE RELAXING: MORE THAN HALF THE DAYS
8. IF YOU CHECKED OFF ANY PROBLEMS, HOW DIFFICULT HAVE THESE MADE IT FOR YOU TO DO YOUR WORK, TAKE CARE OF THINGS AT HOME, OR GET ALONG WITH OTHER PEOPLE?: VERY DIFFICULT
GAD7 TOTAL SCORE: 11
2. NOT BEING ABLE TO STOP OR CONTROL WORRYING: MORE THAN HALF THE DAYS
7. FEELING AFRAID AS IF SOMETHING AWFUL MIGHT HAPPEN: SEVERAL DAYS
7. FEELING AFRAID AS IF SOMETHING AWFUL MIGHT HAPPEN: SEVERAL DAYS

## 2022-09-09 NOTE — PROGRESS NOTES
Assessment & Plan     Encounter for medical examination to establish care  Previous care was at [facility] - has another primary but wanting to change - 67 yo PM female with several medical concerns       Stomach pain  Ongoing for several months - being followed by [facility] - has endoscopy scheduled this wk    Chronic pain of both shoulders  Could be related to tension and computer work  - suggested massage and PT - she has providers in mind that she wants to use    Moderate episode of recurrent major depressive disorder (H)  Moderate - seeing a therapist weekly and a psychiatrist -     MARIELY (generalized anxiety disorder)  Moderate - no panic attacks - is on remeron     Weight loss  Could relate to stomach problems and not able to eat much     Osteopenia of multiple sites  Would like to get repeat DXA and then review with her - discussed calcium and vit D       Return in about 3 months (around 2022) for osteoporosis follow up.    Skylar Prajapati MD PhD  [facility]    Time note ((n5, 60'): The total time (on the date of service) for this service was 70 minutes, including discussion/face-to-face, chart review, interpretation not otherwise reported, documentation, and updating of the computerized record.      Lori Houston is a 66 year old , presenting for the following health issues:establish care       HPI  67 yo PM female who has several ongoing problems and comes in to establish care.  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    She has a hx of depression in the past and in the last 6  Months has had major life changes - her father  from COVID, she retired, and she had a major move back to [place].  Since March she is experiencing a high level of anxiety - she is seeing a therapist weekly and has a psychiatrist she sees every 2-4 wks.  She is on remeron and it does  help.  She has been advised to take more of the ativan. This does  Help her sleep.    She is experiencing upper bilateral shoulder pain and neck pain which she feels is exacerbated by computer work.  She is taking tylenol for this - she has an ergonomics consult coming up soon.  She is using lidocaine patches OTC and a heating pad. She did have PT [facility] [employer] [facility] but wasn't happy with them.  She has an appt with a massage therapist from [facility] end of the month.       She has stomach pain for the last 4-6 months and is being followed by [facility]. Saw a nutritionist and tried digestive enzymes.    SHe has also been losing weight - was 120 in Dec now is 105.  When she eats she gets pain in the stomach.  She describes it as fullness and  achy and feels bloated. She has nausea with it. She tried omeprazole but didn't help. She had a colonoscopy 2/2022, CT of abdomen, and has an endoscopy and US this Friday.      3/2022 had high level of anxiety - had depression in the past - lost her father in mid of COVID, partially retired, moved here from [facility].  - has a psychiatrist and taking remeron and helping - has a therapist 1x/wk - seens psych between 2-4 wks.  Recommended taking ativan more regularly  - helps with sleep. She has an active meditation program.  She is planning to see a acupuncturist. MARIELY = 12 PHQ9=9        Hx of back pain and took lots ibuprofen and had indigestion -  Has been losing weight - 120-105 from Dec. Starts to eat and then getting pain in stomach - has been seeing them starting in May - seeing [facility]  Has had Upper endooscopy -  Colonoscopy 2/2022 - has upper endoscopy scheduled on Friday -      She broke her 2nd toe right foot a few wks ago - she is taping it - it is getting better     [id number]  - LMP 10 yrs  Ago - sometimes  Itchy and red -  No bleeding  - no discharge - paps are normal  Mammogram spring 2022.  Last pap was 2020  At [facility]  - had a hysterectomy - but has a  "cervix  - pap here was 2013     DEXA  - has osteopenia - taking calcium  7/2022   The estimated 10-year risk for a major osteoporotic fracture is 11.3% and for a hip fracture is 2.5%.     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.              Review of Systems   Answers for HPI/ROS submitted by the patient on 9/5/2022  MARIELY 7 TOTAL SCORE: 11  General Symptoms: Yes  Skin Symptoms: No  HENT Symptoms: No  EYE SYMPTOMS: No  HEART SYMPTOMS: No  LUNG SYMPTOMS: No  INTESTINAL SYMPTOMS: Yes  URINARY SYMPTOMS: No  GYNECOLOGIC SYMPTOMS: No  BREAST SYMPTOMS: No  SKELETAL SYMPTOMS: Yes  BLOOD SYMPTOMS: No  NERVOUS SYSTEM SYMPTOMS: Yes  MENTAL HEALTH SYMPTOMS: Yes  Loss of appetite: Yes  Weight loss: Yes  Weight gain: No  Night sweats: No  Increased stress: Yes  Feeling hot or cold when others believe the temperature is normal: No  Loss of height: No  Post-operative complications: No  Surgical site pain: No  Change in or Loss of Energy: No  Hyperactivity: No  Confusion: No  Bloating: No  Blood in stool: No  Black stools: No  Fecal incontinence: No  Yellowing of skin or eyes: No  Vomit with blood: No  Change in stools: No  Bone pain: No  Muscle cramps: Yes  Muscle weakness: Yes  Joint stiffness: Yes  Bone fracture: Yes  Trouble with coordination: No  Fainting or black-out spells: No  Memory loss: No  Speech problems: No  Tingling: Yes  Difficulty walking: Yes  Paralysis: No  Depression: Yes  Trouble sleeping: Yes  Mood changes: No  Panic attacks: No       Objective    /79 (BP Location: Right arm, Patient Position: Sitting, Cuff Size: Adult Regular)   Pulse 80   Ht 1.6 m (5' 3\")   Wt 47.6 kg (105 lb)   LMP  (LMP Unknown)   BMI 18.60 kg/m    Body mass index is 18.6 kg/m .  Physical Exam   GENERAL: healthy, alert and flat affect "   EYES: Eyes grossly normal to inspection, PERRL and conjunctivae and sclerae normal  HENT: ear canals and TM's normal, nose and mouth mask  NECK: no adenopathy, no asymmetry, masses, or scars and thyroid normal to palpation  RESP: lungs clear to auscultation - no rales, rhonchi or wheezes  BREAST: normal without masses, tenderness or nipple discharge and no palpable axillary masses or adenopathy  CV: regular rate and rhythm, normal S1 S2, no S3 or S4, no murmur, click or rub, no peripheral edema and peripheral pulses strong  ABDOMEN: no distension - active bowel sounds, soft, nontender, no hepatosplenomegaly, no masses   MS: no gross musculoskeletal defects noted, no edema  PELVIC: labia mild atrophy - vaginal mucosa - pale and thinned, cervix atrophic on bimanual no palpable masses - uterus absent no adnexal masses  RECTAL: no masses   SKIN: no suspicious lesions or rashes  NEURO: Normal strength and tone, mentation intact and speech normal  PSYCH: mentation appears normal, affect normal/bright    Skylar Prajapati MD, PhD

## 2022-09-12 ENCOUNTER — OFFICE VISIT (OUTPATIENT)
Dept: FAMILY MEDICINE | Facility: CLINIC | Age: 67
End: 2022-09-12
Attending: FAMILY MEDICINE
Payer: COMMERCIAL

## 2022-09-12 VITALS
DIASTOLIC BLOOD PRESSURE: 79 MMHG | WEIGHT: 105 LBS | HEIGHT: 63 IN | HEART RATE: 80 BPM | SYSTOLIC BLOOD PRESSURE: 111 MMHG | BODY MASS INDEX: 18.61 KG/M2

## 2022-09-12 DIAGNOSIS — M25.511 CHRONIC PAIN OF BOTH SHOULDERS: ICD-10-CM

## 2022-09-12 DIAGNOSIS — F41.1 GAD (GENERALIZED ANXIETY DISORDER): ICD-10-CM

## 2022-09-12 DIAGNOSIS — M25.512 CHRONIC PAIN OF BOTH SHOULDERS: ICD-10-CM

## 2022-09-12 DIAGNOSIS — F33.1 MODERATE EPISODE OF RECURRENT MAJOR DEPRESSIVE DISORDER (H): ICD-10-CM

## 2022-09-12 DIAGNOSIS — R10.9 STOMACH PAIN: ICD-10-CM

## 2022-09-12 DIAGNOSIS — Z00.00 ENCOUNTER FOR MEDICAL EXAMINATION TO ESTABLISH CARE: Primary | ICD-10-CM

## 2022-09-12 DIAGNOSIS — M85.89 OSTEOPENIA OF MULTIPLE SITES: ICD-10-CM

## 2022-09-12 DIAGNOSIS — R63.4 WEIGHT LOSS: ICD-10-CM

## 2022-09-12 DIAGNOSIS — G89.29 CHRONIC PAIN OF BOTH SHOULDERS: ICD-10-CM

## 2022-09-12 PROCEDURE — 99205 OFFICE O/P NEW HI 60 MIN: CPT | Performed by: FAMILY MEDICINE

## 2022-09-12 PROCEDURE — G0463 HOSPITAL OUTPT CLINIC VISIT: HCPCS

## 2022-09-12 NOTE — NURSING NOTE
Chief Complaint   Patient presents with     Follow Up     Physical     Pt is here for an annual visit and pap

## 2022-09-12 NOTE — PATIENT INSTRUCTIONS
Try massage therapy for your upper back and shoulders  Try PT for this also  Continue with tylenol  Continue with [facility] for the stomach pain  See me in 3 months   Mammogram 5/2023  DXA shows osteopenia  -  Patient should take 1200mg of calcium/day in divided doses and vitamin D3 1000IU/day.  No need for further paps    Nutritious diet  Eat 1200 mg calcium total every day.  Many people achieve this by a diet containing calcium (milk, yogurt, cheese, and other dairy products, supplemented food) and 1 calcium pill. If you don't eat dairy, you should take a calcium supplement 2 times a day.  1000 IU of vitamin D on daily basis.    Eat a variety of fruits and vegetables and eat whole grains.  Try to choose foods with a high NuVal score, if your grocery store shows this number. High numbers, like 80 or 90, are nutritious foods, and low scores, like 10 are less nutritious foods.          Men should drink no more than 2 alcoholic beverages daily on average; women should drink no more than 1 alcoholic beverage daily on average.    Everyone should avoid all tobacco and nicotine-containing products.    Exercise: Aim for:  Moderate activity (where you can still talk while doing it, such as walking about 100 steps per minute, or 3,000 steps in 30 minutes) for 30 minutes at least 5 days a week  Or  Vigorous exercise (you are working so hard you are breathing hard and fast and your heart rate has gone up, such as playing basketball or soccer) at least 75 minutes weekly    If you have trouble doing 30 minutes of activity, all at once, try small bursts of activity. Go to www.Done In :60 Seconds[employer] for suggestions on how you can do this at home or work.     All adults should try to do muscle strengthening exercise at least 2 days a week    Safety  Always wear bike/motorcycle helmet and seatbelt in a vehicle  Make sure your home has smoke and carbon monoxide detectors.  Wear broad spectrum sunscreen of at least SPF 15 on sun-exposed  skin.    Preventing disease  See your dentist at least once a year  Have your eyes checked every 1-2 years.  Get a flu shot each year.

## 2022-09-13 ENCOUNTER — OFFICE VISIT (OUTPATIENT)
Dept: FAMILY MEDICINE | Facility: CLINIC | Age: 67
End: 2022-09-13
Payer: COMMERCIAL

## 2022-09-13 ENCOUNTER — LAB (OUTPATIENT)
Dept: LAB | Facility: CLINIC | Age: 67
End: 2022-09-13
Payer: COMMERCIAL

## 2022-09-13 VITALS
BODY MASS INDEX: 18.36 KG/M2 | WEIGHT: 103.6 LBS | OXYGEN SATURATION: 96 % | RESPIRATION RATE: 14 BRPM | DIASTOLIC BLOOD PRESSURE: 83 MMHG | SYSTOLIC BLOOD PRESSURE: 120 MMHG | HEIGHT: 63 IN | TEMPERATURE: 98.6 F | HEART RATE: 90 BPM

## 2022-09-13 DIAGNOSIS — N89.8 VAGINAL DRYNESS: ICD-10-CM

## 2022-09-13 DIAGNOSIS — R10.13 ABDOMINAL PAIN, EPIGASTRIC: ICD-10-CM

## 2022-09-13 DIAGNOSIS — Z01.818 PREOP GENERAL PHYSICAL EXAM: Primary | ICD-10-CM

## 2022-09-13 DIAGNOSIS — R63.4 WEIGHT LOSS: ICD-10-CM

## 2022-09-13 DIAGNOSIS — F41.1 GENERALIZED ANXIETY DISORDER: ICD-10-CM

## 2022-09-13 DIAGNOSIS — Z20.822 ENCOUNTER FOR LABORATORY TESTING FOR COVID-19 VIRUS: ICD-10-CM

## 2022-09-13 PROCEDURE — U0003 INFECTIOUS AGENT DETECTION BY NUCLEIC ACID (DNA OR RNA); SEVERE ACUTE RESPIRATORY SYNDROME CORONAVIRUS 2 (SARS-COV-2) (CORONAVIRUS DISEASE [COVID-19]), AMPLIFIED PROBE TECHNIQUE, MAKING USE OF HIGH THROUGHPUT TECHNOLOGIES AS DESCRIBED BY CMS-2020-01-R: HCPCS

## 2022-09-13 PROCEDURE — U0005 INFEC AGEN DETEC AMPLI PROBE: HCPCS

## 2022-09-13 PROCEDURE — 99214 OFFICE O/P EST MOD 30 MIN: CPT | Performed by: FAMILY MEDICINE

## 2022-09-13 ASSESSMENT — ANXIETY QUESTIONNAIRES
1. FEELING NERVOUS, ANXIOUS, OR ON EDGE: NEARLY EVERY DAY
2. NOT BEING ABLE TO STOP OR CONTROL WORRYING: NEARLY EVERY DAY
6. BECOMING EASILY ANNOYED OR IRRITABLE: NOT AT ALL
5. BEING SO RESTLESS THAT IT IS HARD TO SIT STILL: NOT AT ALL
3. WORRYING TOO MUCH ABOUT DIFFERENT THINGS: NEARLY EVERY DAY
IF YOU CHECKED OFF ANY PROBLEMS ON THIS QUESTIONNAIRE, HOW DIFFICULT HAVE THESE PROBLEMS MADE IT FOR YOU TO DO YOUR WORK, TAKE CARE OF THINGS AT HOME, OR GET ALONG WITH OTHER PEOPLE: NOT DIFFICULT AT ALL
GAD7 TOTAL SCORE: 12
7. FEELING AFRAID AS IF SOMETHING AWFUL MIGHT HAPPEN: NOT AT ALL

## 2022-09-13 ASSESSMENT — PATIENT HEALTH QUESTIONNAIRE - PHQ9
SUM OF ALL RESPONSES TO PHQ QUESTIONS 1-9: 9
5. POOR APPETITE OR OVEREATING: NEARLY EVERY DAY

## 2022-09-13 ASSESSMENT — PAIN SCALES - GENERAL: PAINLEVEL: NO PAIN (0)

## 2022-09-13 NOTE — PROGRESS NOTES
[facility]  [address], [address]  [facility] [address]  Phone: [phone]  Primary Provider: Spring Calhoun  Pre-op Performing Provider: SPRING CALHOUN      PREOPERATIVE EVALUATION:  Today's date: 9/13/2022    Celso Cates is a 66 year old female who presents for a preoperative evaluation.    Surgical Information:  Surgery/Procedure: Endoscopy  Surgery Location: [facility]  Surgeon: Garett Hunt MD  Surgery Date: 9/16/22  Time of Surgery: 0800  Where patient plans to recover: At home alone  Fax number for surgical facility: [phone]    Type of Anesthesia Anticipated: to be determined    Assessment & Plan     The proposed surgical procedure is considered INTERMEDIATE risk.    Weight loss  baseline wt was 120'lbs and  about 14 lbs weight loss in past 6 months     Abdominal pain, epigastric  Plan: - considered this problem when making today's plans  - followed by specialist. [facility] & scheduled for ultrasound EGD 9/16/22    Generalized anxiety disorder  - considered this problem when making today's plans  - no interventions today       -followed by specialist.Dr Po brownlee      Vaginal dryness  No acte concerns     Preop general physical exam  Ultrasound EGD by [facility] 9/16/22           Risks and Recommendations:  The patient has the following additional risks and recommendations for perioperative complications:   - No identified additional risk factors other than previously addressed    Medication Instructions:  Patient is to take all scheduled medications on the day of surgery    RECOMMENDATION:  APPROVAL GIVEN to proceed with proposed procedure, without further diagnostic evaluation.    Review of external notes as documented above           Subjective     HPI related to upcoming procedure:due to upper gastric pain and weight loss she is scheduled to get endoscopic EGD/ [facility]   Digestive problems start since March 2022  She suffers from early satiety ,wt  loss and on &  upper  abdomen pain, intermittent, food makes it worse. Gall bladder work up- negative  .   Complete endoscopy 6/28/22. Normal biopsy .  Blood test- including TSH- normal and inconclusive.  If above ultrasound EGD is normal than the plan is to proceed with Gastroenterology motlity test.  Wt Readings from Last 5 Encounters:   09/13/22 47 kg (103 lb 9.6 oz)   09/12/22 47.6 kg (105 lb)   08/10/22 48.1 kg (106 lb)   08/08/22 48.3 kg (106 lb 6 oz)   06/27/22 50 kg (110 lb 4.8 oz)       MH under care of Dr Covington  And they feel that there maybe a component of anxiety contributing or even causing low appetite and wt loss.    Preop Questions 9/13/2022   1. Have you ever had a heart attack or stroke? No   2. Have you ever had surgery on your heart or blood vessels, such as a stent placement, a coronary artery bypass, or surgery on an artery in your head, neck, heart, or legs? No   3. Do you have chest pain with activity? No   4. Do you have a history of  heart failure? No   5. Do you currently have a cold, bronchitis or symptoms of other infection? No   6. Do you have a cough, shortness of breath, or wheezing? No   7. Do you or anyone in your family have previous history of blood clots? No   8. Do you or does anyone in your family have a serious bleeding problem such as prolonged bleeding following surgeries or cuts? No   9. Have you ever had problems with anemia or been told to take iron pills? No   10. Have you had any abnormal blood loss such as black, tarry or bloody stools, or abnormal vaginal bleeding? No   11. Have you ever had a blood transfusion? No   12. Are you willing to have a blood transfusion if it is medically needed before, during, or after your surgery? Yes   13. Have you or any of your relatives ever had problems with anesthesia? No   14. Do you have sleep apnea, excessive snoring or daytime drowsiness? No   15. Do you have any artifical heart valves or other implanted medical devices like a  pacemaker, defibrillator, or continuous glucose monitor? No   16. Do you have artificial joints? No   17. Are you allergic to latex? No       Health Care Directive:  Patient does not have a Health Care Directive or Living Will: Discussed advance care planning with patient; information given to patient to review.    Preoperative Review of :   reviewed - controlled substances reflected in medication list.          Review of Systems  Constitutional, neuro, ENT, endocrine, pulmonary, cardiac, gastrointestinal, genitourinary, musculoskeletal, integument and psychiatric systems are negative, except as otherwise noted.    Patient Active Problem List    Diagnosis Date Noted     Arthritis of carpometacarpal (CMC) joint of right thumb 08/12/2022     Priority: Medium     Lipoma of skin and subcutaneous tissue 08/12/2022     Priority: Medium     SI (sacroiliac) joint dysfunction 08/12/2022     Priority: Medium     Right anterior shoulder pain 08/01/2022     Priority: Medium     Osteopenia of multiple sites 08/01/2022     Priority: Medium     weight bearing exercise- repeat bone density in 1-2 yrs       Thumb pain, right 07/06/2022     Priority: Medium     Bilateral hip pain 05/06/2022     Priority: Medium     Anxiety disorder 04/16/2014     Priority: Medium     Bladder spasm 04/16/2014     Priority: Medium     Third degree uterine prolapse 09/17/2013     Priority: Medium     Did see Dr. Dupont, at [facility] for consult         Past Medical History:   Diagnosis Date     Squamous cell carcinoma of hand     mohs procedure     Third degree uterine prolapse     repair in 1/2014     Past Surgical History:   Procedure Laterality Date     Da Karen laparoscopic sacral colpopexy, supracervical hysterectomy, retropubic midurethral sling.  01/07/2014     LASIK       MOHS MICROGRAPHIC PROCEDURE  10/01/2013     RETINAL REATTACHMENT       Current Outpatient Medications   Medication Sig Dispense Refill     Calcium-Magnesium-Vitamin D (CALCIUM  MAGNESIUM PO)        estradiol (ESTRACE VAGINAL) 0.1 MG/GM vaginal cream Place 1 g vaginally three times a week 30 g 11     eszopiclone (LUNESTA) 2 MG tablet Take 1 tablet (2 mg) by mouth nightly as needed for sleep 30 tablet 1     LORazepam (ATIVAN) 0.5 MG tablet Take 0.5 mg by mouth as needed 1/2 tab up to 3 times per week       mirtazapine (REMERON) 7.5 MG tablet Take 7.5 mg by mouth At Bedtime 1-2 tabs at bedtime as needed       Multiple Vitamins-Minerals (WOMENS MULTIVITAMIN PLUS PO)  (Patient not taking: No sig reported)         No Known Allergies     Social History     Tobacco Use     Smoking status: Never Smoker     Smokeless tobacco: Never Used   Substance Use Topics     Alcohol use: Yes     Alcohol/week: 2.5 - 4.2 standard drinks     Types: 3 - 5 drink(s) per week     Family History   Problem Relation Age of Onset     Breast Cancer Mother         late 40's     Hypertension Father      Glaucoma Father      Diabetes No family hx of      Macular Degeneration No family hx of      History   Drug Use No         Objective     LMP  (LMP Unknown)     Physical Exam    GENERAL APPEARANCE: healthy, alert and no distress     EYES: EOMI, PERRL     NECK: no adenopathy, no asymmetry, masses, or scars and thyroid normal to palpation     RESP: lungs clear to auscultation - no rales, rhonchi or wheezes     CV: regular rates and rhythm, normal S1 S2, no S3 or S4 and no murmur, click or rub     ABDOMEN:  soft, nontender, no HSM or masses and bowel sounds normal     MS: extremities normal- no gross deformities noted, no evidence of inflammation in joints, FROM in all extremities.     SKIN: no suspicious lesions or rashes     NEURO: Normal strength and tone, sensory exam grossly normal, mentation intact and speech normal     PSYCH: mentation appears normal. and affect normal/bright     LYMPHATICS: No cervical adenopathy    No results for input(s): HGB, PLT, INR, NA, POTASSIUM, CR, A1C in the last 22426 hours.      Diagnostics:  No labs were ordered during this visit.   No EKG required, no history of coronary heart disease, significant arrhythmia, peripheral arterial disease or other structural heart disease.    Revised Cardiac Risk Index (RCRI):  The patient has the following serious cardiovascular risks for perioperative complications:   - No serious cardiac risks = 0 points     RCRI Interpretation: 0 points: Class I (very low risk - 0.4% complication rate)           Signed Electronically by: Spring Fletcher MD  Copy of this evaluation report is provided to requesting physician.

## 2022-09-13 NOTE — PATIENT INSTRUCTIONS
Preparing for Your Surgery  Getting started  A nurse will call you to review your health history and instructions. They will give you an arrival time based on your scheduled surgery time. Please be ready to share:    Your doctor's clinic name and phone number    Your medical, surgical and anesthesia history    A list of allergies and sensitivities    A list of medicines, including herbal treatments and over-the-counter drugs    Whether the patient has a legal guardian (ask how to send us the papers in advance)  Please tell us if you're pregnant--or if there's any chance you might be pregnant. Some surgeries may injure a fetus (unborn baby), so they require a pregnancy test. Surgeries that are safe for a fetus don't always need a test, and you can choose whether to have one.   If you have a child who's having surgery, please ask for a copy of Preparing for Your Child's Surgery.    Preparing for surgery    Within 30 days of surgery: Have a pre-op exam (sometimes called an H&P, or History and Physical). This can be done at a clinic or pre-operative center.  ? If you're having a , you may not need this exam. Talk to your care team.    At your pre-op exam, talk to your care team about all medicines you take. If you need to stop any medicines before surgery, ask when to start taking them again.  ? We do this for your safety. Many medicines can make you bleed too much during surgery. Some change how well surgery (anesthesia) drugs work.    Call your insurance company to let them know you're having surgery. (If you don't have insurance, call [phone].)    Call your clinic if there's any change in your health. This includes signs of a cold or flu (sore throat, runny nose, cough, rash, fever). It also includes a scrape or scratch near the surgery site.    If you have questions on the day of surgery, call your hospital or surgery center.  COVID testing  You may need to be tested for COVID-19 before having  surgery. If so, we will give you instructions.  Eating and drinking guidelines  For your safety: Unless your surgeon tells you otherwise, follow the guidelines below.    Eat and drink as usual until 8 hours before surgery. After that, no food or milk.    Drink clear liquids until 2 hours before surgery. These are liquids you can see through, like water, Gatorade and Propel Water. You may also have black coffee and tea (no cream or milk).    Nothing by mouth within 2 hours of surgery. This includes gum, candy and breath mints.    If you drink alcohol: Stop drinking it the night before surgery.    If your care team tells you to take medicine on the morning of surgery, it's okay to take it with a sip of water.  Preventing infection    Shower or bathe the night before and morning of your surgery. Follow the instructions your clinic gave you. (If no instructions, use regular soap.)    Don't shave or clip hair near your surgery site. We'll remove the hair if needed.    Don't smoke or vape the morning of surgery. You may chew nicotine gum up to 2 hours before surgery. A nicotine patch is okay.  ? Note: Some surgeries require you to completely quit smoking and nicotine. Check with your surgeon.    Your care team will make every effort to keep you safe from infection. We will:  ? Clean our hands often with soap and water (or an alcohol-based hand rub).  ? Clean the skin at your surgery site with a special soap that kills germs.  ? Give you a special gown to keep you warm. (Cold raises the risk of infection.)  ? Wear special hair covers, masks, gowns and gloves during surgery.  ? Give antibiotic medicine, if prescribed. Not all surgeries need antibiotics.  What to bring on the day of surgery    Photo ID and insurance card    Copy of your health care directive, if you have one    Glasses and hearing aides (bring cases)  ? You can't wear contacts during surgery    Inhaler and eye drops, if you use them (tell us about these when  you arrive)    CPAP machine or breathing device, if you use them    A few personal items, if spending the night    If you have . . .  ? A pacemaker, ICD (cardiac defibrillator) or other implant: Bring the ID card.  ? An implanted stimulator: Bring the remote control.  ? A legal guardian: Bring a copy of the certified (court-stamped) guardianship papers.  Please remove any jewelry, including body piercings. Leave jewelry and other valuables at home.  If you're going home the day of surgery    You must have a responsible adult drive you home. They should stay with you overnight as well.    If you don't have someone to stay with you, and you aren't safe to go home alone, we may keep you overnight. Insurance often won't pay for this.  After surgery  If it's hard to control your pain or you need more pain medicine, please call your surgeon's office.  Questions?   If you have any questions for your care team, list them here: _________________________________________________________________________________________________________________________________________________________________________ ____________________________________ ____________________________________ ____________________________________  For informational purposes only. Not to replace the advice of your health care provider. Copyright   2003, 2019 [facility]. All rights reserved. Clinically reviewed by Mirian Poole MD. [employer]le [id number] - REV 07/21.

## 2022-09-14 ENCOUNTER — MYC MEDICAL ADVICE (OUTPATIENT)
Dept: NEUROLOGY | Facility: CLINIC | Age: 67
End: 2022-09-14

## 2022-09-14 LAB — SARS-COV-2 RNA RESP QL NAA+PROBE: NEGATIVE

## 2022-09-21 ENCOUNTER — TRANSFERRED RECORDS (OUTPATIENT)
Dept: HEALTH INFORMATION MANAGEMENT | Facility: CLINIC | Age: 67
End: 2022-09-21

## 2022-09-28 ENCOUNTER — THERAPY VISIT (OUTPATIENT)
Dept: OCCUPATIONAL THERAPY | Facility: CLINIC | Age: 67
End: 2022-09-28
Payer: COMMERCIAL

## 2022-09-28 DIAGNOSIS — M79.644 THUMB PAIN, RIGHT: Primary | ICD-10-CM

## 2022-09-28 PROCEDURE — 97535 SELF CARE MNGMENT TRAINING: CPT | Mod: GO | Performed by: OCCUPATIONAL THERAPIST

## 2022-09-28 PROCEDURE — 97110 THERAPEUTIC EXERCISES: CPT | Mod: GO | Performed by: OCCUPATIONAL THERAPIST

## 2022-09-28 NOTE — PROGRESS NOTES
Discharge Note - Hand Therapy      Current Date:  9/28/2022    Diagnosis: R thumb pain (tendinitis, thumb MCP per MD)  Per special testing on 7/6/2022 Pt appears to have irritation of the right thumb CMC joint as well as irritation of the thumb APB muscle.    Orders 6/27/22  Onset ~ 3 weeks ago    Precautions: None    Subjective:    The thumb had been doing much better. It did get sore the past day or 2. Exercises seem to help.   Had ergo assessment, helpful. I notice I tend to tighten and raise my R shoulder when I type. I think there are some habits, need some retraining and take a while to sort them out.  Did have a lot of R shoulder pain, calming down a bit more now.    Occupational Profile Information:  Right hand dominant  Prior functional level:  no limitations  Patient reports symptoms of pain, stiffness/loss of motion, weakness/loss of strength and edema  Currently working in normal job without restrictions. Working ~1/2 days, from home, 20 minutes at a time. Takes frequent braeks. Uses standing/seated desk.  Small keyboard works well. Likes the [employer] mouse.  Leisure activities/hobbies: has been working on [employer] (wants to complete a baby blanket)    Objective:  Pain Level (Scale 0-10)   7/6/2022 9/28/2022     At Rest 0 0   With Use 5 Mild, but can vary     Pain Description  Date 7/6/2022 9/29/2022     Location Base of the thumb R thumb, ext wad, posterior upper back   Pain Quality Sharp, Shooting and Tender.  Its nagging - tootie with typing    Frequency intermittent      Pain is worst  daytime    Exacerbated by  certain motions, , turn.  Stretch and press with the R thumb. Trying to use L thumb on spacebar    Relieved by Rest. Tylenol seems to help a bit - does not take ibuprofen    Progression  slowly improving possibly      Edema  Mild, of the R IF    Sensation   WNL throughout all nerve distributions; per patient report    AROM:   B wrists are WNL all planes    ROM  Thumb 7/6/2022 7/6/2022  9/28/2022     AROM  (PROM) R L R   MP /50 /55 40   IP /32 /45 46   RABD 35 26    PABD 35, pain ~10 sec after 40 41   Retropulsion      Kapandji Opposition Scale (0-10/10)          Palpation:   Tenderness / pain Report: - none  + mild    ++ moderate    +++ severe      7/6/2022 7/6/2022 9/28/2022     Location Right Left    Radial thumb CMCj + -    Volar thumb CMC joint + - + tender   Thumb APB - mid muscle + -    Scaphoid tubercle - -    1st dorsal compartment - -    Extensor wad + at EDC/ECRB/L  Has been really painful to upper posterior quadrant, lateral arm, extensor wad       Palpation:   Pain Report:  - none    + mild    ++ moderate    +++ severe     8/2/2022      Right   Traps - upper trap, and upper medial +   Supraspinatus on scapula ++   Triangular Interval  (between long and lateral heads of triceps and just inferior to teres major) +   Anterior shoulder +   Lateral shoulder +         Strength  Testing deferred      Assessment:  Response to therapy has been improvement to:  Pain:  frequency is less, intensity of pain is decreased, duration of pain is decreased at the thumb in particular    Overall Assessment:  Patient is progressing well and is ready to discharge to home program.STG/LTG:  STGoals have been reviewed and progress or achievement has occurred;  see goal sheet for details and updates.  LTGoals have been reviewed and progress or achievement has occurred:  see goal sheet for details and updates.    Plan:  Discharge from hand therapy    Home Exercise Program:  Right small ottobock orthosis to wear at night - prefers her neoprene wrap - wears sometimes.  Avoid positions of thumb irritation  Provided with some joint protection handout  Gave up on knitting,  Work stretches, work breaks, foam roller (lats)  1st DA AROM  Had home ergo assessment  Christian ramsay. Uses apple KB - works well

## 2022-09-29 PROBLEM — M79.644 THUMB PAIN, RIGHT: Status: RESOLVED | Noted: 2022-07-06 | Resolved: 2022-09-29

## 2022-10-11 ENCOUNTER — TELEPHONE (OUTPATIENT)
Dept: NEUROLOGY | Facility: CLINIC | Age: 67
End: 2022-10-11

## 2022-10-11 NOTE — TELEPHONE ENCOUNTER
[facility] Call Center    Phone Message    May a detailed message be left on voicemail: yes     Reason for Call: Other: Pt called needing to reschedule EMG appt and appt to review results. Unfortunately appt for results is 02/23 which is a few months after EMG.  Pt would like to see about being able to move that appt up or work something out to go over results sooner.    Please call Pt back at [phone] to discuss.    Action Taken: Message routed to:  Other: [facility]    Travel Screening: Not Applicable

## 2022-10-13 NOTE — TELEPHONE ENCOUNTER
Called pt and LVM to call back. We do not schedule EMG's so we cannot help if she wanted to schedule that one sooner.     As for moving up her appt with Dr. Mann, there is limited availability as we are booked up. Advised to call back to see if an appt opened up and add her name to wait list.     Wandy SEPULVEDA RN, BSN  [facility]

## 2022-10-13 NOTE — TELEPHONE ENCOUNTER
Patient called again really wanting to be seen sooner than the end of February to review EMG results.  EMG is scheduled for Dec 14th.  Is it possible to schedule patient on the 26th of January as there are 2  30 minute time slots on hold by the provider that day.

## 2022-10-14 NOTE — TELEPHONE ENCOUNTER
Patient can be scheduled on January 26th at 11a or 1:30p per Wandy. Patient wants to move appointment in February forward.

## 2022-10-22 ENCOUNTER — HEALTH MAINTENANCE LETTER (OUTPATIENT)
Age: 67
End: 2022-10-22

## 2022-10-27 ENCOUNTER — OFFICE VISIT (OUTPATIENT)
Dept: FAMILY MEDICINE | Facility: CLINIC | Age: 67
End: 2022-10-27
Payer: COMMERCIAL

## 2022-10-27 DIAGNOSIS — L81.4 SOLAR LENTIGO: ICD-10-CM

## 2022-10-27 DIAGNOSIS — D18.01 CHERRY ANGIOMA: ICD-10-CM

## 2022-10-27 DIAGNOSIS — D22.9 MULTIPLE BENIGN NEVI: ICD-10-CM

## 2022-10-27 DIAGNOSIS — D22.9 NUMEROUS MOLES: ICD-10-CM

## 2022-10-27 DIAGNOSIS — L90.5 SCAR: Primary | ICD-10-CM

## 2022-10-27 DIAGNOSIS — Z85.828 HISTORY OF SCC (SQUAMOUS CELL CARCINOMA) OF SKIN: ICD-10-CM

## 2022-10-27 PROCEDURE — 99203 OFFICE O/P NEW LOW 30 MIN: CPT | Performed by: PHYSICIAN ASSISTANT

## 2022-10-27 ASSESSMENT — PAIN SCALES - GENERAL: PAINLEVEL: NO PAIN (0)

## 2022-10-27 NOTE — PROGRESS NOTES
[facility] Dermatology Note  Encounter Date: Oct 27, 2022  Office Visit     Dermatology Problem List:  FBSE, 10/27/2022  1. Hx of NSC  - SCC - left dorsal hand, s/p MMS ~2013 (patient reported - removed in [place])  - SCC -right rios, s/p Mohs, 2020 (patient reported- removed in [place])  ____________________________________________    Assessment & Plan:    # Benign lesions: Multiple benign nevi, solar lentigos, cherry angiomas. Explained to patient benign nature of lesion. No treatment is necessary at this time unless the lesion changes or becomes symptomatic.   - ABCDs of melanoma were discussed and self skin checks were advised.  - Sun precaution was advised including the use of sun screens of SPF 30 or higher, sun protective clothing, and avoidance of tanning beds.    # Hx of NMSC, no evidence of recurrent disease  - Continue regular skin exams  - Sunscreen: Apply 20 minutes prior to going outdoors and reapply every two hours, when wet or sweating. We recommend using an SPF 30 or higher, and to use one that is water resistant.       Procedures Performed:   None.    Follow-up: 1 year(s) in-person, or earlier for new or changing lesions    Staff and Scribe:     Scribe Disclosure:  I, MAYITO TOUSSAINT, am serving as a scribe to document services personally performed by Francine Sutherland PA-C based on data collection and the provider's statements to me.   Provider Disclosure:   The documentation recorded by the scribe accurately reflects the services I personally performed and the decisions made by me.    All risks, benefits and alternatives were discussed with patient.  Patient is in agreement and understands the assessment and plan.  All questions were answered.    Francine Sutherland PA-C, MPAS  [facility]: Phone: [phone], Fax: [phone]  [facility]: Phone: [phone],  Fax: [phone]  [facility]  [place] [facility] [place]: Phone: [phone], Fax: [phone]    ____________________________________________    CC: Skin Check (Hx of SCC 2014 [place] and  [place])    HPI:  Ms. Celso Cates is a(n) 66 year old female who presents today as a new patient for FBSE. Referred to dermatology on 7/5/22 by Dr. Fletcher for examination of numerous moles.     Today, the patient has no specific areas of concern. She notes a personal history of SCC on the left hand and right leg, which were treated with Mohs in [place]. We do not have these records. Patient notes the most recent one was 2 years ago on the leg.     Patient is otherwise feeling well, without additional skin concerns.    Labs Reviewed:  N/A    Physical Exam:  Vitals: LMP  (LMP Unknown)   SKIN: Full skin, which includes the head/face, both arms, chest, back, abdomen,both legs, genitalia and/or groin buttocks, digits and/or nails, was examined.  - Andujar Skin Type I  - Well healed scars on the left dorsal hand and right shin.  - There are dome shaped bright red papules on the trunk and extremities.   - Multiple regular brown pigmented macules and papules are identified on the trunk and extremities.   - Scattered brown macules on sun exposed areas.  - No other lesions of concern on areas examined.     Medications:  Current Outpatient Medications   Medication     Calcium-Magnesium-Vitamin D (CALCIUM MAGNESIUM PO)     estradiol (ESTRACE VAGINAL) 0.1 MG/GM vaginal cream     eszopiclone (LUNESTA) 2 MG tablet     LORazepam (ATIVAN) 0.5 MG tablet     mirtazapine (REMERON) 7.5 MG tablet     No current facility-administered medications for this visit.      Past Medical History:   Patient Active Problem List   Diagnosis     Third degree uterine prolapse     Anxiety disorder     Bladder spasm     Bilateral hip pain     Right anterior shoulder pain     Osteopenia of multiple sites     Arthritis of carpometacarpal (CMC) joint of right thumb     Lipoma of skin and  subcutaneous tissue     SI (sacroiliac) joint dysfunction     Weight loss     Abdominal pain, epigastric     Past Medical History:   Diagnosis Date     Squamous cell carcinoma of hand     mohs procedure     Third degree uterine prolapse     repair in 1/2014        CC Spring Fletcher MD  [address] [place] [address] on close of this encounter.

## 2022-10-27 NOTE — PATIENT INSTRUCTIONS
Patient Education     Checking for Skin Cancer  You can find cancer early by checking your skin each month. There are 3 kinds of skin cancer. They are melanoma, basal cell carcinoma, and squamous cell carcinoma. Doing monthly skin checks is the best way to find new marks or skin changes. Follow the instructions below for checking your skin.   The ABCDEs of checking moles for melanoma   Check your moles or growths for signs of melanoma using ABCDE:   Asymmetry: the sides of the mole or growth don t match  Border: the edges are ragged, notched, or blurred  Color: the color within the mole or growth varies  Diameter: the mole or growth is larger than 6 mm (size of a pencil eraser)  Evolving: the size, shape, or color of the mole or growth is changing (evolving is not shown in the images below)    Checking for other types of skin cancer  Basal cell carcinoma or squamous cell carcinoma have symptoms such as:     A spot or mole that looks different from all other marks on your skin  Changes in how an area feels, such as itching, tenderness, or pain  Changes in the skin's surface, such as oozing, bleeding, or scaliness  A sore that does not heal  New swelling or redness beyond the border of a mole    Who s at risk?  Anyone can get skin cancer. But you are at greater risk if you have:   Fair skin, light-colored hair, or light-colored eyes  Many moles or abnormal moles on your skin  A history of sunburns from sunlight or tanning beds  A family history of skin cancer  A history of exposure to radiation or chemicals  A weakened immune system  If you have had skin cancer in the past, you are at risk for recurring skin cancer.   How to check your skin  Do your monthly skin checkups in front of a full-length mirror. Check all parts of your body, including your:   Head (ears, face, neck, and scalp)  Torso (front, back, and sides)  Arms (tops, undersides, upper, and lower armpits)  Hands (palms, backs, and fingers, including  under the nails)  Buttocks and genitals  Legs (front, back, and sides)  Feet (tops, soles, toes, including under the nails, and between toes)  If you have a lot of moles, take digital photos of them each month. Make sure to take photos both up close and from a distance. These can help you see if any moles change over time.   Most skin changes are not cancer. But if you see any changes in your skin, call your doctor right away. Only he or she can diagnose a problem. If you have skin cancer, seeing your doctor can be the first step toward getting the treatment that could save your life.   [employer] last reviewed this educational content on 4/1/2019 2000-2020 [employer]. [address] [place] [address], [place] [address]. All rights reserved. This information is not intended as a substitute for professional medical care. Always follow your healthcare professional's instructions.       When should I call my doctor?  If you are worsening or not improving, please, contact us or seek urgent care as noted below.     Who should I call with questions (adults)?  [facility] (adult and pediatric): [phone]  [facility] (adult): [phone]  For urgent needs outside of business hours call the [facility] at [phone] and ask for the dermatology resident on call to be paged  If this is a medical emergency and you are unable to reach an ER, Call [phone]    Who should I call with questions (pediatric)?  [facility]- Pediatric Dermatology  Dr. Natty Long, Dr. Stanton Erazo, Dr. Cecy Cerrato, JAVY Richard, Dr. Nighat Madsen, Dr. Shayna Bunch & Dr. Jarek Allen  Non-urgent nurse triage line; [phone]- Jen and Kathryn CASTRO Care Coordinatordavid Chaves (/Complex ) [phone]    If you need a prescription refill, please contact your pharmacy. Refills are approved or denied by our  Physicians during normal business hours, Monday through Fridays  Per office policy, refills will not be granted if you have not been seen within the past year (or sooner depending on your child's condition)    Scheduling Information:  Pediatric Appointment Scheduling and Call Center [phone]  Radiology Scheduling- [phone]  Sedation Unit Scheduling- [phone]  [place] Scheduling- General [phone]; Pediatric Dermatology [phone]  Main  Services: [phone]  Korean: [phone]  Saudi Arabian: [phone]  Hmong/Emirati/Urdu: [phone]  Preadmission Nursing Department Fax Number: [phone] (Fax all pre-operative paperwork to this number)    For urgent matters arising during evenings, weekends, or holidays that cannot wait for normal business hours please call [phone] and ask for the dermatology resident on call to be paged.

## 2022-10-27 NOTE — LETTER
10/27/2022         RE: Celso Cates  [address]  [facility] [address]        Dear Colleague,    Thank you for referring your patient, Celso Cates, to the [facility]. Please see a copy of my visit note below.    [facility] [place] Dermatology Note  Encounter Date: Oct 27, 2022  Office Visit     Dermatology Problem List:  FBSE, 10/27/2022  1. Hx of NSC  - SCC - left dorsal hand, s/p MMS ~2013 (patient reported - removed in [place])  - SCC -right rios, s/p Mohs, 2020 (patient reported- removed in [place])  ____________________________________________    Assessment & Plan:    # Benign lesions: Multiple benign nevi, solar lentigos, cherry angiomas. Explained to patient benign nature of lesion. No treatment is necessary at this time unless the lesion changes or becomes symptomatic.   - ABCDs of melanoma were discussed and self skin checks were advised.  - Sun precaution was advised including the use of sun screens of SPF 30 or higher, sun protective clothing, and avoidance of tanning beds.    # Hx of NMSC, no evidence of recurrent disease  - Continue regular skin exams  - Sunscreen: Apply 20 minutes prior to going outdoors and reapply every two hours, when wet or sweating. We recommend using an SPF 30 or higher, and to use one that is water resistant.       Procedures Performed:   None.    Follow-up: 1 year(s) in-person, or earlier for new or changing lesions    Staff and Scribe:     Scribe Disclosure:  I, MAYITO TOUSSAINT, am serving as a scribe to document services personally performed by Francine Sutherland PA-C based on data collection and the provider's statements to me.   Provider Disclosure:   The documentation recorded by the scribe accurately reflects the services I personally performed and the decisions made by me.    All risks, benefits and alternatives were discussed with patient.  Patient is in agreement and understands the assessment and plan.  All questions were  answered.    Francine Sutherland PA-C, MPAS  [facility] [place]: Phone: [phone], Fax: [phone]  [facility]: Phone: [phone],  Fax: [phone]  [facility] [place]: Phone: [phone], Fax: [phone]    ____________________________________________    CC: Skin Check (Hx of SCC 2014 [place] and  [place] of [place])    HPI:  Ms. Celso Cates is a(n) 66 year old female who presents today as a new patient for FBSE. Referred to dermatology on 7/5/22 by Dr. Fletcher for examination of numerous moles.     Today, the patient has no specific areas of concern. She notes a personal history of SCC on the left hand and right leg, which were treated with Mohs in [place]. We do not have these records. Patient notes the most recent one was 2 years ago on the leg.     Patient is otherwise feeling well, without additional skin concerns.    Labs Reviewed:  N/A    Physical Exam:  Vitals: LMP  (LMP Unknown)   SKIN: Full skin, which includes the head/face, both arms, chest, back, abdomen,both legs, genitalia and/or groin buttocks, digits and/or nails, was examined.  - Andujar Skin Type I  - Well healed scars on the left dorsal hand and right shin.  - There are dome shaped bright red papules on the trunk and extremities.   - Multiple regular brown pigmented macules and papules are identified on the trunk and extremities.   - Scattered brown macules on sun exposed areas.  - No other lesions of concern on areas examined.     Medications:  Current Outpatient Medications   Medication     Calcium-Magnesium-Vitamin D (CALCIUM MAGNESIUM PO)     estradiol (ESTRACE VAGINAL) 0.1 MG/GM vaginal cream     eszopiclone (LUNESTA) 2 MG tablet     LORazepam (ATIVAN) 0.5 MG tablet     mirtazapine (REMERON) 7.5 MG tablet     No current facility-administered medications for this visit.      Past Medical History:   Patient Active Problem List   Diagnosis      Third degree uterine prolapse     Anxiety disorder     Bladder spasm     Bilateral hip pain     Right anterior shoulder pain     Osteopenia of multiple sites     Arthritis of carpometacarpal (CMC) joint of right thumb     Lipoma of skin and subcutaneous tissue     SI (sacroiliac) joint dysfunction     Weight loss     Abdominal pain, epigastric     Past Medical History:   Diagnosis Date     Squamous cell carcinoma of hand     mohs procedure     Third degree uterine prolapse     repair in 1/2014        CC Spring Fletcher MD  [address] [phone] on close of this encounter.      Again, thank you for allowing me to participate in the care of your patient.        Sincerely,        Francine Sutherland PA-C

## 2022-10-30 ASSESSMENT — ANXIETY QUESTIONNAIRES
4. TROUBLE RELAXING: MORE THAN HALF THE DAYS
7. FEELING AFRAID AS IF SOMETHING AWFUL MIGHT HAPPEN: NOT AT ALL
7. FEELING AFRAID AS IF SOMETHING AWFUL MIGHT HAPPEN: NOT AT ALL
IF YOU CHECKED OFF ANY PROBLEMS ON THIS QUESTIONNAIRE, HOW DIFFICULT HAVE THESE PROBLEMS MADE IT FOR YOU TO DO YOUR WORK, TAKE CARE OF THINGS AT HOME, OR GET ALONG WITH OTHER PEOPLE: SOMEWHAT DIFFICULT
8. IF YOU CHECKED OFF ANY PROBLEMS, HOW DIFFICULT HAVE THESE MADE IT FOR YOU TO DO YOUR WORK, TAKE CARE OF THINGS AT HOME, OR GET ALONG WITH OTHER PEOPLE?: SOMEWHAT DIFFICULT
3. WORRYING TOO MUCH ABOUT DIFFERENT THINGS: MORE THAN HALF THE DAYS
1. FEELING NERVOUS, ANXIOUS, OR ON EDGE: MORE THAN HALF THE DAYS
2. NOT BEING ABLE TO STOP OR CONTROL WORRYING: MORE THAN HALF THE DAYS
6. BECOMING EASILY ANNOYED OR IRRITABLE: NOT AT ALL
GAD7 TOTAL SCORE: 10
5. BEING SO RESTLESS THAT IT IS HARD TO SIT STILL: MORE THAN HALF THE DAYS
GAD7 TOTAL SCORE: 10

## 2022-10-31 ENCOUNTER — TELEPHONE (OUTPATIENT)
Dept: OBGYN | Facility: CLINIC | Age: 67
End: 2022-10-31

## 2022-10-31 ENCOUNTER — MYC MEDICAL ADVICE (OUTPATIENT)
Dept: FAMILY MEDICINE | Facility: CLINIC | Age: 67
End: 2022-10-31

## 2022-10-31 ENCOUNTER — OFFICE VISIT (OUTPATIENT)
Dept: FAMILY MEDICINE | Facility: CLINIC | Age: 67
End: 2022-10-31
Attending: FAMILY MEDICINE
Payer: COMMERCIAL

## 2022-10-31 VITALS
HEIGHT: 63 IN | DIASTOLIC BLOOD PRESSURE: 76 MMHG | BODY MASS INDEX: 18.04 KG/M2 | WEIGHT: 101.8 LBS | HEART RATE: 80 BPM | SYSTOLIC BLOOD PRESSURE: 113 MMHG

## 2022-10-31 DIAGNOSIS — R63.4 WEIGHT LOSS: Primary | ICD-10-CM

## 2022-10-31 DIAGNOSIS — E63.9 POOR NUTRITION: ICD-10-CM

## 2022-10-31 DIAGNOSIS — S76.011A MUSCLE STRAIN OF RIGHT GLUTEAL REGION, INITIAL ENCOUNTER: ICD-10-CM

## 2022-10-31 DIAGNOSIS — M62.84 SARCOPENIA: ICD-10-CM

## 2022-10-31 DIAGNOSIS — M62.81 GENERALIZED MUSCLE WEAKNESS: ICD-10-CM

## 2022-10-31 PROCEDURE — 99215 OFFICE O/P EST HI 40 MIN: CPT | Performed by: FAMILY MEDICINE

## 2022-10-31 PROCEDURE — G0463 HOSPITAL OUTPT CLINIC VISIT: HCPCS

## 2022-10-31 PROCEDURE — 99417 PROLNG OP E/M EACH 15 MIN: CPT | Performed by: FAMILY MEDICINE

## 2022-10-31 NOTE — NURSING NOTE
Chief Complaint   Patient presents with     Weight Problem     Pt is here today stating she is continuously losing weight since March 2022. She also reports having tremors in her hands.  Digestive issues. She also reports having an endoscopy in 10.2022 that was negative. And last colonoscopy was also  negative 02.2022. Pt also reports having a glute injury she would like a referral to PT.

## 2022-10-31 NOTE — TELEPHONE ENCOUNTER
Patient calling back to let us know that she was able to get the SIBO test ordered through an integrative health clinic she goes to, so we no longer have to worry about getting her the SIBO test.

## 2022-10-31 NOTE — PATIENT INSTRUCTIONS
Physical therapy referral ordered  Check with medicare on nutritional referral   Continue work up with [facility]  See me in 1 month.

## 2022-10-31 NOTE — NURSING NOTE
Spoke with pt and she was informed per Dr. Prajapati that she would not be able to order her breath test, pt then informed me she was able to get this done through an outside provider. Pt was also informed that I faxed her PT referral to ginny Matamoros as asked , Pt was also informed by her insurance that she did not need a referral to nutrition she can pick one within her network(s) of her choosing. YOVANI THORNTON

## 2022-10-31 NOTE — TELEPHONE ENCOUNTER
Patient needs SIBO hydrogen breath test/glucose breath test.      Called [facility], who will be unable to get her in for weeks.     Wants this to be ordered through a different site/from a different provider so she can do it sooner.     Billing code: [id number]

## 2022-11-02 ENCOUNTER — HOSPITAL ENCOUNTER (OUTPATIENT)
Dept: NUCLEAR MEDICINE | Facility: CLINIC | Age: 67
Setting detail: NUCLEAR MEDICINE
Discharge: HOME | End: 2022-11-02
Attending: INTERNAL MEDICINE | Admitting: INTERNAL MEDICINE
Payer: COMMERCIAL

## 2022-11-02 DIAGNOSIS — R10.13 DYSPEPSIA: ICD-10-CM

## 2022-11-02 PROCEDURE — 78264 GASTRIC EMPTYING IMG STUDY: CPT

## 2022-11-02 PROCEDURE — 78264 GASTRIC EMPTYING IMG STUDY: CPT | Mod: 26 | Performed by: RADIOLOGY

## 2022-11-02 PROCEDURE — A9541 TC99M SULFUR COLLOID: HCPCS

## 2022-11-02 PROCEDURE — 343N000001 HC RX 343

## 2022-11-02 RX ADMIN — Medication 2.17 MILLICURIE: at 08:32

## 2022-11-03 ENCOUNTER — TRANSFERRED RECORDS (OUTPATIENT)
Dept: HEALTH INFORMATION MANAGEMENT | Facility: CLINIC | Age: 67
End: 2022-11-03

## 2022-11-03 DIAGNOSIS — S76.011A MUSCLE STRAIN OF RIGHT GLUTEAL REGION, INITIAL ENCOUNTER: Primary | ICD-10-CM

## 2022-11-04 NOTE — PROGRESS NOTES
Physiatry orders faxed to [facility] at [phone].      Patient notified that referral has been faxed.

## 2022-11-04 NOTE — PROGRESS NOTES
Called patient to clarify referral request.      Patient wants to see either Nydia Tian, Mary Lou Camacho, or Laura Aguilar at [facility].   [address]   [address]  [phone]    Order filled and pended to Dr. Prajapati for signing.  [facility]

## 2022-11-15 ENCOUNTER — TELEPHONE (OUTPATIENT)
Dept: NEUROLOGY | Facility: CLINIC | Age: 67
End: 2022-11-15

## 2022-11-15 NOTE — TELEPHONE ENCOUNTER
Patient has EMG on Dec 14th need a f/u called and she agreed to the current open spot on Jan 4th at 10:30 am . Celso Cates is a 67 year old female who presents today for advised I would continue to look for something closer and let her know also advised I would send her a [email or web address] Message since she was driving

## 2022-11-28 ENCOUNTER — OFFICE VISIT (OUTPATIENT)
Dept: DERMATOLOGY | Facility: CLINIC | Age: 67
End: 2022-11-28
Payer: COMMERCIAL

## 2022-11-28 DIAGNOSIS — L72.8 OTHER FOLLICULAR CYSTS OF THE SKIN AND SUBCUTANEOUS TISSUE: ICD-10-CM

## 2022-11-28 DIAGNOSIS — L02.92 FURUNCLE: Primary | ICD-10-CM

## 2022-11-28 PROCEDURE — 99213 OFFICE O/P EST LOW 20 MIN: CPT | Mod: GC | Performed by: DERMATOLOGY

## 2022-11-28 ASSESSMENT — PAIN SCALES - GENERAL: PAINLEVEL: NO PAIN (0)

## 2022-11-28 NOTE — PATIENT INSTRUCTIONS
Monitor the lesion at home, if it is not healed in 3 weeks please send me a MyChart message so we can reevaluate you in clinic.     Can apply vaseline and bandage to area at home   Can resume showering tomorrow

## 2022-11-28 NOTE — NURSING NOTE
Dermatology Rooming Note    Celso Cates's goals for this visit include:   Chief Complaint   Patient presents with     Derm Problem     Celso is here today for a lesion of concern on her left leg      Carmelina Martin, CMA

## 2022-11-28 NOTE — PROGRESS NOTES
[facility] Dermatology Note  Encounter Date: Nov 28, 2022  Office Visit     Dermatology Problem List:  FBSE, 10/27/2022  1. Hx of NSC  - SCC - left dorsal hand, s/p MMS ~2013 (patient reported - removed in [place])  - SCC -right rios, s/p Mohs, 2020 (patient reported- removed in [place])    2. Furuncle, L anterior shin  - Incised and expressed debris 11/28/2022, advised to return if not improving  ____________________________________________    Assessment & Plan:     1. Furuncle, L anterior shin  Suspected infected/irritated hair follicle based on exam.   - s/p incision and drainage on 11/28/2022   - return if not improving in 3 weeks      Procedures Performed:   - Furuncle on L anterior shin was incized with a #11 blade and cotton swabs used to express drainage, no anesthetic needed. Vaseline and bandage applied    Follow-up: prn for new or changing lesions    Staff and Resident:     Seen and discussed with Paola Delatorre  PGY-2 [facility] Internal Medicine  [phone]    Staff Physician Comments:   I saw and evaluated the patient with the resident and I edited the assessment and plan as documented in the note. I was present for the entire minor procedure and examination.    Jarek Abdalla MD   of Dermatology  Department of Dermatology  [facility]      ____________________________________________    CC: Derm Problem (Celso is here today for a lesion of concern on her left leg )    HPI:  Ms. Celso Cates is a(n) 67 year old female who presents today as a return patient for evaluation of skin lesion on her L leg. Previously seen by Dr. Sutherland 10/27/2022 at which time FBSE was benign. Pt notes a lesion on her L anterior shin that is new from prior, slightly tender to touch. Reminded her of a prior skin cancerous spot on her R shin so she would like it evaluated. No bleeding or itching.    Patient is otherwise feeling well, without  additional skin concerns.    Labs Reviewed:  N/A    Physical Exam:  Vitals: LMP  (LMP Unknown)   SKIN: Focused examination of L anterior shin was performed.  - Raised papule with dark center and hair follicle, mild surrounding erythema 0.5 cm  - No other lesions of concern on areas examined.     Medications:  Current Outpatient Medications   Medication     Calcium-Magnesium-Vitamin D (CALCIUM MAGNESIUM PO)     estradiol (ESTRACE VAGINAL) 0.1 MG/GM vaginal cream     eszopiclone (LUNESTA) 2 MG tablet     LORazepam (ATIVAN) 0.5 MG tablet     mirtazapine (REMERON) 7.5 MG tablet     No current facility-administered medications for this visit.      Past Medical History:   Patient Active Problem List   Diagnosis     Third degree uterine prolapse     Anxiety disorder     Bladder spasm     Bilateral hip pain     Right anterior shoulder pain     Osteopenia of multiple sites     Arthritis of carpometacarpal (CMC) joint of right thumb     Lipoma of skin and subcutaneous tissue     SI (sacroiliac) joint dysfunction     Weight loss     Abdominal pain, epigastric     Past Medical History:   Diagnosis Date     Squamous cell carcinoma of hand     mohs procedure     Third degree uterine prolapse     repair in 1/2014       CC No referring provider defined for this encounter. on close of this encounter.

## 2022-11-28 NOTE — LETTER
11/28/2022       RE: Celso Cates  [address]  [facility] [address]     Dear Colleague,    Thank you for referring your patient, Celso Cates, to the [facility]. Please see a copy of my visit note below.    [facility] Dermatology Note  Encounter Date: Nov 28, 2022  Office Visit     Dermatology Problem List:  FBSE, 10/27/2022  1. Hx of NSC  - SCC - left dorsal hand, s/p MMS ~2013 (patient reported - removed in [place])  - SCC -right rios, s/p Mohs, 2020 (patient reported- removed in [place])    2. Furuncle, L anterior shin  - Incised and expressed debris 11/28/2022, advised to return if not improving  ____________________________________________    Assessment & Plan:     1. Furuncle, L anterior shin  Suspected infected/irritated hair follicle based on exam.   - s/p incision and drainage on 11/28/2022   - return if not improving in 3 weeks      Procedures Performed:   - Furuncle on L anterior shin was incized with a #11 blade and cotton swabs used to express drainage, no anesthetic needed. Vaseline and bandage applied    Follow-up: prn for new or changing lesions    Staff and Resident:     Seen and discussed with Paola Delatorre  PGY-2 [facility] Internal Medicine  [phone]    Staff Physician Comments:   I saw and evaluated the patient with the resident and I edited the assessment and plan as documented in the note. I was present for the entire minor procedure and examination.    Jarek Abdalla MD   of Dermatology  Department of Dermatology  [facility]      ____________________________________________    CC: Derm Problem (Celso is here today for a lesion of concern on her left leg )    HPI:  Ms. Celso Cates is a(n) 67 year old female who presents today as a return patient for evaluation of skin lesion on her L leg. Previously  seen by Dr. Sutherland 10/27/2022 at which time FBSE was benign. Pt notes a lesion on her L anterior shin that is new from prior, slightly tender to touch. Reminded her of a prior skin cancerous spot on her R shin so she would like it evaluated. No bleeding or itching.    Patient is otherwise feeling well, without additional skin concerns.    Labs Reviewed:  N/A    Physical Exam:  Vitals: LMP  (LMP Unknown)   SKIN: Focused examination of L anterior shin was performed.  - Raised papule with dark center and hair follicle, mild surrounding erythema 0.5 cm  - No other lesions of concern on areas examined.     Medications:  Current Outpatient Medications   Medication     Calcium-Magnesium-Vitamin D (CALCIUM MAGNESIUM PO)     estradiol (ESTRACE VAGINAL) 0.1 MG/GM vaginal cream     eszopiclone (LUNESTA) 2 MG tablet     LORazepam (ATIVAN) 0.5 MG tablet     mirtazapine (REMERON) 7.5 MG tablet     No current facility-administered medications for this visit.      Past Medical History:   Patient Active Problem List   Diagnosis     Third degree uterine prolapse     Anxiety disorder     Bladder spasm     Bilateral hip pain     Right anterior shoulder pain     Osteopenia of multiple sites     Arthritis of carpometacarpal (CMC) joint of right thumb     Lipoma of skin and subcutaneous tissue     SI (sacroiliac) joint dysfunction     Weight loss     Abdominal pain, epigastric     Past Medical History:   Diagnosis Date     Squamous cell carcinoma of hand     mohs procedure     Third degree uterine prolapse     repair in 1/2014       CC No referring provider defined for this encounter. on close of this encounter.

## 2022-12-14 ENCOUNTER — OFFICE VISIT (OUTPATIENT)
Dept: NEUROLOGY | Facility: CLINIC | Age: 67
End: 2022-12-14
Payer: COMMERCIAL

## 2022-12-14 DIAGNOSIS — R20.0 NUMBNESS OF RIGHT LOWER EXTREMITY: ICD-10-CM

## 2022-12-14 PROCEDURE — 95909 NRV CNDJ TST 5-6 STUDIES: CPT | Performed by: PSYCHIATRY & NEUROLOGY

## 2022-12-14 PROCEDURE — 95886 MUSC TEST DONE W/N TEST COMP: CPT | Performed by: PSYCHIATRY & NEUROLOGY

## 2022-12-14 NOTE — LETTER
2022       RE: Celso Cates  [address]  [facility] [address]     Dear Colleague,    Thank you for referring your patient, Celso Cates, to the [facility] EMG CLINIC [facility]. Please see a copy of my visit note below.                        [facility]  Electrodiagnostic Laboratory                 Department of Neurology                                                                                                         Test Date:  2022    Patient: Celso Cates : 1955 Physician: Jarek Álvarez MD   Sex: Female AGE: 67 year Ref Phys:    ID#: [id number]   Technician: Kristy Behling     History and Examination:  Celso Cates is a 67 year old woman with numbness in the right lower limb. She is referred for evaluation of possible neuropathy or radiculopathy.    Techniques:  Motor conduction studies were done with surface recording electrodes. Sensory conduction studies were performed with surface electrodes, unless indicated otherwise by (n), designating the use of subdermal recording electrodes. Temperature was monitored and recorded throughout the study. Lower extremities were maintained at a temperature of 31 degrees Centigrade or higher.  EMG was done with a concentric needle electrode.     Results:  Bilateral sural and superficial fibular sensory conduction studies were normal. Right fibular and tibial motor conduction studies were normal. Electromyography was normal.     Interpretation:  This is a normal study. In particular, there is no electrophysiologic evidence of mononeuropathy, polyneuropathy, or radiculopathy.      _____________________________  Jarek Álvarez MD  Board Certified in Clinical Neurophysiology and Neuromuscular Medicine        Nerve Conduction Studies  Motor Sites      Latency Amplitude Neg. Amp Diff Distance Velocity Neg. Dur Neg Area Diff Temperature Comment   Site (ms) Norm (mV) Norm % cm  m/s Norm ms %  C    Right Fibular (EDB) Motor   Ankle 3.9  < 6.0 2.0  > 2.0  8   6.5  31.7    Bel Fib Head 11.2 - 1.63 - -18.5 36 49  > 38 7.4 -7.7 31.7    Pop Fossa 13.4 - 1.20 - -26.4 10 45  > 38 6.8 -33.3 31.8    Right Tibial (AHB) Motor   Ankle 4.3  < 6.5 11.6  > 4.4  8   7.6  31.8    Knee 13.3 - 5.3 - -54.3 37 41  > 38 7.8 -57.5 31.9      Sensory Sites      Onset Lat Peak Lat Amp (O-P) Amp (P-P) Segment Distance Velocity Temperature   Site ms ms  V Norm  V  cm m/s Norm  C   Left Superficial Fibular Sensory   14 cm-Ankle 1.95 2.7 6  > 3 8 14 cm-Ankle 12.5 64  > 38 32.6   Right Superficial Fibular Sensory   14 cm-Ankle 3.1 3.9 5  > 3 4 14 cm-Ankle 12.5 40  > 38 30.5   Left Sural Sensory   Calf-Lat Mall 2.9 3.7 10  > 5 12 Calf-Lat Mall 14 48  > 38 32.5   Right Sural Sensory   Calf-Lat Mall 2.9 3.9 18  > 5 21 Calf-Lat Mall 14 48  > 38 31.4     F Wave Studies     Min-F Max-F Dispersion Persistence Mean-F F-Norm L-R Mean-F L-R Mean-F Norm F/M Ratio F-M Lat (ms)   Right Tibial (Abd Hallucis)  31.8  C   45.63 51.56 5.93 100.00 49.63 <61  <5.7 1.80 39.22       Electromyography     Side Muscle Ins Act Fibs/PSW Fasc HF Amp Dur Poly Recrt Int Pat   Right Tib Anterior Nml None Nml 0 Nml Nml 0 Nml Nml   Right Gastroc MH Nml None Nml 0 Nml Nml 0 Nml Nml   Right Vastus Lat Nml None Nml 0 Nml Nml 0 Nml Nml   Right Biceps Fem SH Nml None Nml 0 Nml Nml 0 Nml Nml   Right Gluteus Med Nml None Nml 0 Nml Nml 0 Nml Nml   Right L5 Parasp Nml None Nml 0              NCS Waveforms:    Motor         Sensory                   Again, thank you for allowing me to participate in the care of your patient.      Sincerely,    Jarek Álvarez MD

## 2022-12-14 NOTE — PROGRESS NOTES
[facility]  Electrodiagnostic Laboratory                 Department of Neurology                                                                                                         Test Date:  2022    Patient: Celso Cates : 1955 Physician: Jarek Álvarez MD   Sex: Female AGE: 67 year Ref Phys:    ID#: [id number]   Technician: Kristy Behling     History and Examination:  Celso Cates is a 67 year old woman with numbness in the right lower limb. She is referred for evaluation of possible neuropathy or radiculopathy.    Techniques:  Motor conduction studies were done with surface recording electrodes. Sensory conduction studies were performed with surface electrodes, unless indicated otherwise by (n), designating the use of subdermal recording electrodes. Temperature was monitored and recorded throughout the study. Lower extremities were maintained at a temperature of 31 degrees Centigrade or higher.  EMG was done with a concentric needle electrode.     Results:  Bilateral sural and superficial fibular sensory conduction studies were normal. Right fibular and tibial motor conduction studies were normal. Electromyography was normal.     Interpretation:  This is a normal study. In particular, there is no electrophysiologic evidence of mononeuropathy, polyneuropathy, or radiculopathy.      _____________________________  Jarek Álvarez MD  Board Certified in Clinical Neurophysiology and Neuromuscular Medicine        Nerve Conduction Studies  Motor Sites      Latency Amplitude Neg. Amp Diff Distance Velocity Neg. Dur Neg Area Diff Temperature Comment   Site (ms) Norm (mV) Norm % cm m/s Norm ms %  C    Right Fibular (EDB) Motor   Ankle 3.9  < 6.0 2.0  > 2.0  8   6.5  31.7    Bel Fib Head 11.2 - 1.63 - -18.5 36 49  > 38 7.4 -7.7 31.7    Pop Fossa 13.4 - 1.20 - -26.4 10 45  > 38 6.8 -33.3 31.8    Right Tibial (AHB) Motor   Ankle 4.3  < 6.5 11.6  > 4.4  8   7.6  31.8    Knee 13.3 -  5.3 - -54.3 37 41  > 38 7.8 -57.5 31.9      Sensory Sites      Onset Lat Peak Lat Amp (O-P) Amp (P-P) Segment Distance Velocity Temperature   Site ms ms  V Norm  V  cm m/s Norm  C   Left Superficial Fibular Sensory   14 cm-Ankle 1.95 2.7 6  > 3 8 14 cm-Ankle 12.5 64  > 38 32.6   Right Superficial Fibular Sensory   14 cm-Ankle 3.1 3.9 5  > 3 4 14 cm-Ankle 12.5 40  > 38 30.5   Left Sural Sensory   Calf-Lat Mall 2.9 3.7 10  > 5 12 Calf-Lat Mall 14 48  > 38 32.5   Right Sural Sensory   Calf-Lat Mall 2.9 3.9 18  > 5 21 Calf-Lat Mall 14 48  > 38 31.4     F Wave Studies     Min-F Max-F Dispersion Persistence Mean-F F-Norm L-R Mean-F L-R Mean-F Norm F/M Ratio F-M Lat (ms)   Right Tibial (Abd Hallucis)  31.8  C   45.63 51.56 5.93 100.00 49.63 <61  <5.7 1.80 39.22       Electromyography     Side Muscle Ins Act Fibs/PSW Fasc HF Amp Dur Poly Recrt Int Pat   Right Tib Anterior Nml None Nml 0 Nml Nml 0 Nml Nml   Right Gastroc MH Nml None Nml 0 Nml Nml 0 Nml Nml   Right Vastus Lat Nml None Nml 0 Nml Nml 0 Nml Nml   Right Biceps Fem SH Nml None Nml 0 Nml Nml 0 Nml Nml   Right Gluteus Med Nml None Nml 0 Nml Nml 0 Nml Nml   Right L5 Parasp Nml None Nml 0              NCS Waveforms:    Motor         Sensory

## 2022-12-14 NOTE — PROCEDURES
[facility]  Electrodiagnostic Laboratory                 Department of Neurology                                                                                                         Test Date:  2022    Patient: Celso Cates : 1955 Physician: Jarek Álvarez MD   Sex: Female AGE: 67 year Ref Phys:    ID#: [id number]   Technician: Kristy Behling     History and Examination:      Techniques:  Motor conduction studies were done with surface recording electrodes. Sensory conduction studies were performed with surface electrodes, unless indicated otherwise by (n), designating the use of subdermal recording electrodes. Temperature was monitored and recorded throughout the study. Upper extremities were maintained at a temperature of 32 degrees Centigrade or higher.  EMG was done with a concentric needle electrode.       Results:  All F Wave latencies were within normal limits.      All examined muscles (as indicated in the following table) showed no evidence of electrical instability.          Interpretation:      Comment:          _____________________________  Jarek Álvarez MD  Board Certified in Clinical Neurophysiology and Neuromuscular Medicine        Nerve Conduction Studies  Motor Sites      Latency Amplitude Neg. Amp Diff Distance Velocity Neg. Dur Neg Area Diff Temperature Comment   Site (ms) Norm (mV) Norm % cm m/s Norm ms %  C    Right Fibular (EDB) Motor   Ankle 3.9  < 6.0 2.0  > 2.0  8   6.5  31.7    Bel Fib Head 11.2 - 1.63 - -18.5 36 49  > 38 7.4 -7.7 31.7    Pop Fossa 13.4 - 1.20 - -26.4 10 45  > 38 6.8 -33.3 31.8    Right Tibial (AHB) Motor   Ankle 4.3  < 6.5 11.6  > 4.4  8   7.6  31.8    Knee 13.3 - 5.3 - -54.3 37 41  > 38 7.8 -57.5 31.9      Sensory Sites      Onset Lat Peak Lat Amp (O-P) Amp (P-P) Segment Distance Velocity Temperature   Site ms ms  V Norm  V  cm m/s Norm  C   Left Superficial Fibular Sensory   14 cm-Ankle 1.95 2.7 6  > 3 8 14 cm-Ankle 12.5 64   > 38 32.6   Right Superficial Fibular Sensory   14 cm-Ankle 3.1 3.9 5  > 3 4 14 cm-Ankle 12.5 40  > 38 30.5   Left Sural Sensory   Calf-Lat Mall 2.9 3.7 10  > 5 12 Calf-Lat Mall 14 48  > 38 32.5   Right Sural Sensory   Calf-Lat Mall 2.9 3.9 18  > 5 21 Calf-Lat Mall 14 48  > 38 31.4     F Wave Studies     Min-F Max-F Dispersion Persistence Mean-F F-Norm L-R Mean-F L-R Mean-F Norm F/M Ratio F-M Lat (ms)   Right Tibial (Abd Hallucis)  31.8  C   45.63 51.56 5.93 100.00 49.63 <61  <5.7 1.80 39.22       Electromyography     Side Muscle Ins Act Fibs/PSW Fasc HF Amp Dur Poly Recrt Int Pat   Right Tib Anterior Nml None Nml 0 Nml Nml 0 Nml Nml   Right Gastroc MH Nml None Nml 0 Nml Nml 0 Nml Nml   Right Vastus Lat Nml None Nml 0 Nml Nml 0 Nml Nml   Right Biceps Fem SH Nml None Nml 0 Nml Nml 0 Nml Nml   Right Gluteus Med Nml None Nml 0 Nml Nml 0 Nml Nml   Right L5 Parasp Nml None Nml 0              NCS Waveforms:    Motor         Sensory

## 2022-12-21 NOTE — PROGRESS NOTES
"Discharge Note    Progress reporting period is from last progress note on 07/05/22 to Aug 4, 2022.    Celso failed to follow up and current status is unknown.  Please see information below for last relevant information on current status.  Patient seen for 8 visits.    SUBJECTIVE  Subjective changes noted by patient:  Pt has started to have some R LE numbness after walking or at the end of the day. This sensation is around her anterior ankle and at times into dorsal aspect of foot. This started 2 weeks ago. Feels tightness in anterior hips. Will feel the numbness after walking 1 block. Goes away quickly once she is able to sit.  .  Current pain level is  .     Previous pain level was  7/10.   Changes in function:  Yes (See Goal flowsheet attached for changes in current functional level)  Adverse reaction to treatment or activity: None    OBJECTIVE  Changes noted in objective findings: Lumbar flexion WNL, extension min loss, sidebending WNL. NE. Negative SLR, neg femoral nerve tension. Ankle ROM WNL, strength WNL. Slightly decreased sensation in approx. 4-6\" oblong area anterior/lateral ankle and distal shin. Repeated DF/PF created symptoms, back to baseline within a minute.     ASSESSMENT/PLAN  Diagnosis: bilateral hip pain   Updated problem list and treatment plan:   Pain - HEP  Decreased ROM/flexibility - HEP  Decreased function - HEP  Decreased strength - HEP  Impaired balance - HEP  STG/LTGs have been met or progress has been made towards goals:  Yes, please see goal flowsheet for most current information  Assessment of Progress: current status is unknown.    Last current status: Pt is progressing as expected   Self Management Plans:  HEP  I have re-evaluated this patient and find that the nature, scope, duration and intensity of the therapy is appropriate for the medical condition of the patient.  Celso continues to require the following intervention to meet STG and LTG's:  HEP.    Recommendations:  Discharge " with current home program.  Patient to follow up with MD as needed.    Please refer to the daily flowsheet for treatment today, total treatment time and time spent performing 1:1 timed codes.

## 2022-12-31 ENCOUNTER — E-VISIT (OUTPATIENT)
Dept: URGENT CARE | Facility: CLINIC | Age: 67
End: 2022-12-31
Payer: COMMERCIAL

## 2022-12-31 DIAGNOSIS — Z20.822 SUSPECTED COVID-19 VIRUS INFECTION: Primary | ICD-10-CM

## 2022-12-31 PROCEDURE — 99421 OL DIG E/M SVC 5-10 MIN: CPT | Mod: CS | Performed by: FAMILY MEDICINE

## 2022-12-31 NOTE — PATIENT INSTRUCTIONS
Celso,    Your symptoms show that you may have coronavirus (COVID-19). This illness can cause fever, cough and trouble breathing. Many people get a mild case and get better on their own. Some people can get very sick.    Because you reported additional symptoms, I would like to also test you for flu.    What should I do?  I have placed orders for these tests.   To schedule: go to your [employer] home page and scroll down to the section that says  You have an appointment that needs to be scheduled  and click the large green button that says  Schedule Now  and follow the steps to find the next available openings.     If you are unable to complete these [employer] scheduling steps, please call [phone] to schedule your testing.     These guidelines are for isolating before returning to work, school or .     For employers, schools and day cares: This is an official notice for this person s medical guidelines for returning in-person.     For health care sites: The CDC gives different isolation and quarantine guidelines for healthcare sites, please check with these sites before arriving.     How do I self-isolate?  You isolate when you have symptoms of COVID or a test shows you have COVID, even if you don t have symptoms.     If you DO have symptoms:  o Stay home and away from others  - For at least 5 days after your symptoms started, AND   - You are fever free for 24 hours (with no medicine that reduces fever), AND  - Your other symptoms are better.  o Wear a mask for 10 full days any time you are around others.    If you DON T have symptoms:  o Stay at home and away from others for at least 5 days after your positive test.  o Wear a mask for 10 full days any time you are around others.    How can I take care of myself?  Over the counter medications may help with your symptoms such as runny or stuffy nose, cough, chills, or fever. Talk to your care team about your options.     Some people are at high risk of  severe illness (for example, you have a weak immune system, you re 65 years or older, or you have certain medical problems). If your risk is high and your symptoms started in the last 5 to 7 days, we strongly recommend for you to get COVID treatment as soon as possible. Paxlovid, Molnupiravir and the monoclonal antibody treatments are proven safe and effective, make you feel better faster, and prevent hospitalization and death.       To schedule an appointment to discuss COVID treatment, request an appointment on [employer] (select  COVID-19 Treatment ) or call [employer] ([phone]), press 7.      Get lots of rest. Drink extra fluids (unless a doctor has told you not to)    Take Tylenol (acetaminophen) or ibuprofen for fever or pain. If you have liver or kidney problems, ask your family doctor if it's okay to take Tylenol or ibuprofen    Take over the counter medications for your symptoms, as directed by your doctor. You may also talk to your pharmacist.      If you have other health problems (like cancer, heart failure, an organ transplant or severe kidney disease): Call your specialty clinic if you don't feel better in the next 2 days.    Know when to call 911. Emergency warning signs include:  o Trouble breathing or shortness of breath  o Pain or pressure in the chest that doesn't go away  o Feeling confused like you haven't felt before, or not being able to wake up  o Bluish-colored lips or face    Where can I get more information?    [facility] - About COVID-19: [email or web address]     CDC - What to Do If You're Sick: https://www.cdc.gov/coronavirus/2019-ncov/if-you-are-sick/index.html     CDC - Quarantine & Isolation: https://www.cdc.gov/coronavirus/2019-ncov/your-health/quarantine-isolation.html     [facility] clinical trials (COVID-19 research studies): [email or web address]/umn-clinical-trials    Below are the COVID-19 hotlines at the [place] [facility]  ([facility]). Interpreters are available.  o For health questions: Call 327-933-3045 or 1-395.831.3877 (7 a.m. to 7 p.m.)  o For questions about schools and childcare: Call 148-260-6291 or 1-466.187.5397 (7 a.m. to 7 p.m.)

## 2023-01-02 ENCOUNTER — LAB (OUTPATIENT)
Dept: LAB | Facility: CLINIC | Age: 68
End: 2023-01-02
Attending: FAMILY MEDICINE
Payer: COMMERCIAL

## 2023-01-02 DIAGNOSIS — Z20.822 SUSPECTED COVID-19 VIRUS INFECTION: ICD-10-CM

## 2023-01-02 LAB — SARS-COV-2 RNA RESP QL NAA+PROBE: NEGATIVE

## 2023-01-02 PROCEDURE — U0003 INFECTIOUS AGENT DETECTION BY NUCLEIC ACID (DNA OR RNA); SEVERE ACUTE RESPIRATORY SYNDROME CORONAVIRUS 2 (SARS-COV-2) (CORONAVIRUS DISEASE [COVID-19]), AMPLIFIED PROBE TECHNIQUE, MAKING USE OF HIGH THROUGHPUT TECHNOLOGIES AS DESCRIBED BY CMS-2020-01-R: HCPCS

## 2023-01-02 PROCEDURE — U0005 INFEC AGEN DETEC AMPLI PROBE: HCPCS

## 2023-01-04 ENCOUNTER — VIRTUAL VISIT (OUTPATIENT)
Dept: NEUROLOGY | Facility: CLINIC | Age: 68
End: 2023-01-04
Payer: COMMERCIAL

## 2023-01-04 DIAGNOSIS — R20.0 NUMBNESS OF RIGHT LOWER EXTREMITY: Primary | ICD-10-CM

## 2023-01-04 PROCEDURE — 99214 OFFICE O/P EST MOD 30 MIN: CPT | Mod: 95 | Performed by: PSYCHIATRY & NEUROLOGY

## 2023-01-04 NOTE — PATIENT INSTRUCTIONS
AFTER VISIT SUMMARY (AVS):    At today's visit we thoroughly discussed current symptoms, evaluation results, diagnosis, available treatment options, and the plan.    We decided to continue physical therapy while monitoring your symptoms.  Please come back if they worsen in the future.    Next follow-up appointment is on as needed basis.    Please do not hesitate to call me with any questions or concerns.    Thanks.

## 2023-01-04 NOTE — LETTER
"    1/4/2023         RE: Celso Cates  [address]  [facility] [address]        Dear Colleague,    Thank you for referring your patient, Celso Cates, to the [facility] NEUROLOGY [facility]. Please see a copy of my visit note below.    ESTABLISHED NEUROLOGY TELEMEDICINE VISIT NOTE.    DATE OF VISIT: 1/4/2023  MRN: [id number]  PATIENT NAME: Celso Cates  YOB: 1955    Celso Cates is a 67 year old female who is being evaluated via a billable video visit.      The patient has been notified of following:     \"This video visit will be conducted via a call between you and your physician/provider. We have found that certain health care needs can be provided without the need for an in-person physical exam.  This service lets us provide the care you need with a video conversation.  If a prescription is necessary we can send it directly to your pharmacy.  If lab work is needed we can place an order for that and you can then stop by our lab to have the test done at a later time.    Video visits are billed at different rates depending on your insurance coverage.  Please reach out to your insurance provider with any questions.    If during the course of the call the physician/provider feels a video visit is not appropriate, you will not be charged for this service.\"    Patient has given verbal consent for Video visit? Yes    Will anyone else be joining your video visit? No  {If patient encounters technical issues they should call [phone].    I have reviewed and updated the patient's Past Medical History, Social History, Family History and Medication List.    Xin Jeffrey Visit Facilitator       Additional provider notes:    This is a telemedicine visit that was initiated by the patient and performed with the originating site at patient's home and the distant location, as specified below.  Verbal consent to participate in video visit was obtained.  I discussed with the patient the nature of " our telemedicine visits, that:  - I would evaluate the patient and recommend diagnostics and treatments based on my assessment  - Our sessions are not being recorded and that personal health information is protected  - Our team would provide follow-up care in person if/when the patient needs it.    REASON FOR VISIT:   Chief Complaint   Patient presents with     Follow Up     Review EMG and testing      HISTORY OF PRESENT ILLNESS:                                                    Ms. Celso Cates is 67 year old female patient, who was evaluated today by telemedicine visit for right lower extremity paresthesia.  Please refer to my initial note from 8/10/2022 for further information.    Since the last visit, the patient reports an improvement in her right leg numbness with physical therapy while reducing the amount of walking.  She feels that her toes on the right foot still feel numb and tingly at times.  She also reports right hip/inner thigh pain and paresthesias.  Plans to start aquatic exercises next week to strengthen her core muscles.  Also received a referral for epidural steroid injection, but was hesitant to do so.  Denies new focal neurological symptoms.    Lumbar spine MRI from 8/15/2022 demonstrated right subarticular protrusion at L1-L2 effacing the lateral recess and abutting the descending right L2 nerve root along with mild multilevel degenerative changes.  Images were personally reviewed and independently interpreted.    EMG from 12/14/2022 was normal.  Tabulated data from EMG report were personally reviewed and independently interpreted.  EXAM:                                                    General: pt is in NAD, cooperative.  Skin: no lesions/rashes noticed.  HEENT: ATNC.  Neurological:  awake, cooperative, follows commands, no aphasia or dysarthria noted, cranial nerves II-XII: no ptosis, face is symmetric, equally moves all extremities, no dysmetria bilaterally.  ASSESSMENT AND PLAN:       ASSESSMENT: Celso Cates is a 67 year old female patient, who is evaluated via a telemedicine follow-up visit for right lower extremity paresthesia.    We had a detailed discussion with the patient regarding her presenting complaints, evaluation results, available treatment options, and the plan.  I reviewed results of EMG and lumbar spine MRI.  We discussed that her right hip/thigh pain and paresthesia might be explained by irritation of the right L2 nerve root, but we did not find any explanations for her distal right lower extremity paresthesias.  She reports improvement with physical therapy and reduced amount of walking.  We also discussed that unless she has persistent low back pain, I would not recommend to pursue epidural steroid injection.  I encouraged the patient to continue physical therapy to see if it leads to resolution of her symptoms.  If her symptoms worsen in the future, or she develops left hip flexion weakness/other new neurological symptoms, she should come back for further evaluation.    DIAGNOSES:    ICD-10-CM    1. Numbness of right lower extremity  R20.0         PLAN: At today's visit we thoroughly discussed current symptoms, evaluation results, diagnosis, available treatment options, and the plan.    We decided to continue physical therapy while monitoring her symptoms.  The patient was advised to come back if they worsen in the future.    Next follow-up appointment is on as needed basis.    Video-Visit Details    Type of service:  Video Visit    Video Start Time: 10:34 AM.    Video End Time (time video stopped): 10:49 AM.    Originating Location (pt. Location): Home    Distant Location (provider location):  [facility] NEUROLOGY CLINICS [facility]     Mode of Communication:  Video Conference via [employer]    Total Time: 31 minutes.  15 minutes were spent in video call and the rest for chart review in preparation for this visit and documentation.    Xin Jeffrey    [facility]  [facility]    (Chart documentation was completed in part with Dragon voice-recognition software. Even though reviewed, some grammatical, spelling, and word errors may remain.)      Again, thank you for allowing me to participate in the care of your patient.        Sincerely,        Savage Mann MD

## 2023-01-04 NOTE — PROGRESS NOTES
"ESTABLISHED NEUROLOGY TELEMEDICINE VISIT NOTE.    DATE OF VISIT: 1/4/2023  MRN: [id number]  PATIENT NAME: Celso Cates  YOB: 1955    Celso Cates is a 67 year old female who is being evaluated via a billable video visit.      The patient has been notified of following:     \"This video visit will be conducted via a call between you and your physician/provider. We have found that certain health care needs can be provided without the need for an in-person physical exam.  This service lets us provide the care you need with a video conversation.  If a prescription is necessary we can send it directly to your pharmacy.  If lab work is needed we can place an order for that and you can then stop by our lab to have the test done at a later time.    Video visits are billed at different rates depending on your insurance coverage.  Please reach out to your insurance provider with any questions.    If during the course of the call the physician/provider feels a video visit is not appropriate, you will not be charged for this service.\"    Patient has given verbal consent for Video visit? Yes    Will anyone else be joining your video visit? No  {If patient encounters technical issues they should call [phone].    I have reviewed and updated the patient's Past Medical History, Social History, Family History and Medication List.    Xin Jeffrey Visit Facilitator       Additional provider notes:    This is a telemedicine visit that was initiated by the patient and performed with the originating site at patient's home and the distant location, as specified below.  Verbal consent to participate in video visit was obtained.  I discussed with the patient the nature of our telemedicine visits, that:  - I would evaluate the patient and recommend diagnostics and treatments based on my assessment  - Our sessions are not being recorded and that personal health information is protected  - Our team would provide follow-up " care in person if/when the patient needs it.    REASON FOR VISIT:   Chief Complaint   Patient presents with     Follow Up     Review EMG and testing      HISTORY OF PRESENT ILLNESS:                                                    Ms. Celso Cates is 67 year old female patient, who was evaluated today by telemedicine visit for right lower extremity paresthesia.  Please refer to my initial note from 8/10/2022 for further information.    Since the last visit, the patient reports an improvement in her right leg numbness with physical therapy while reducing the amount of walking.  She feels that her toes on the right foot still feel numb and tingly at times.  She also reports right hip/inner thigh pain and paresthesias.  Plans to start aquatic exercises next week to strengthen her core muscles.  Also received a referral for epidural steroid injection, but was hesitant to do so.  Denies new focal neurological symptoms.    Lumbar spine MRI from 8/15/2022 demonstrated right subarticular protrusion at L1-L2 effacing the lateral recess and abutting the descending right L2 nerve root along with mild multilevel degenerative changes.  Images were personally reviewed and independently interpreted.    EMG from 12/14/2022 was normal.  Tabulated data from EMG report were personally reviewed and independently interpreted.  EXAM:                                                    General: pt is in NAD, cooperative.  Skin: no lesions/rashes noticed.  HEENT: ATNC.  Neurological:  awake, cooperative, follows commands, no aphasia or dysarthria noted, cranial nerves II-XII: no ptosis, face is symmetric, equally moves all extremities, no dysmetria bilaterally.  ASSESSMENT AND PLAN:      ASSESSMENT: Celso Cates is a 67 year old female patient, who is evaluated via a telemedicine follow-up visit for right lower extremity paresthesia.    We had a detailed discussion with the patient regarding her presenting complaints, evaluation results,  available treatment options, and the plan.  I reviewed results of EMG and lumbar spine MRI.  We discussed that her right hip/thigh pain and paresthesia might be explained by irritation of the right L2 nerve root, but we did not find any explanations for her distal right lower extremity paresthesias.  She reports improvement with physical therapy and reduced amount of walking.  We also discussed that unless she has persistent low back pain, I would not recommend to pursue epidural steroid injection.  I encouraged the patient to continue physical therapy to see if it leads to resolution of her symptoms.  If her symptoms worsen in the future, or she develops left hip flexion weakness/other new neurological symptoms, she should come back for further evaluation.    DIAGNOSES:    ICD-10-CM    1. Numbness of right lower extremity  R20.0         PLAN: At today's visit we thoroughly discussed current symptoms, evaluation results, diagnosis, available treatment options, and the plan.    We decided to continue physical therapy while monitoring her symptoms.  The patient was advised to come back if they worsen in the future.    Next follow-up appointment is on as needed basis.    Video-Visit Details    Type of service:  Video Visit    Video Start Time: 10:34 AM.    Video End Time (time video stopped): 10:49 AM.    Originating Location (pt. Location): Home    Distant Location (provider location):  [facility]     Mode of Communication:  Video Conference via [employer]    Total Time: 31 minutes.  15 minutes were spent in video call and the rest for chart review in preparation for this visit and documentation.    Savage Mann MD.    [facility]    (Chart documentation was completed in part with Dragon voice-recognition software. Even though reviewed, some grammatical, spelling, and word errors may remain.)

## 2023-01-17 ENCOUNTER — TRANSFERRED RECORDS (OUTPATIENT)
Dept: HEALTH INFORMATION MANAGEMENT | Facility: CLINIC | Age: 68
End: 2023-01-17
Payer: COMMERCIAL

## 2023-01-19 ENCOUNTER — DOCUMENTATION ONLY (OUTPATIENT)
Dept: FAMILY MEDICINE | Facility: CLINIC | Age: 68
End: 2023-01-19
Payer: COMMERCIAL

## 2023-01-19 NOTE — PROGRESS NOTES
Type of Form Received: POC    Form Received (Date) 1/19/23   Form Filled out Yes 1/26/23   Placed in provider folder Yes

## 2023-03-20 ENCOUNTER — TRANSFERRED RECORDS (OUTPATIENT)
Dept: HEALTH INFORMATION MANAGEMENT | Facility: CLINIC | Age: 68
End: 2023-03-20
Payer: COMMERCIAL

## 2023-03-23 ENCOUNTER — TRANSFERRED RECORDS (OUTPATIENT)
Dept: HEALTH INFORMATION MANAGEMENT | Facility: CLINIC | Age: 68
End: 2023-03-23
Payer: COMMERCIAL

## 2023-03-27 ENCOUNTER — OFFICE VISIT (OUTPATIENT)
Dept: OPHTHALMOLOGY | Facility: CLINIC | Age: 68
End: 2023-03-27
Payer: COMMERCIAL

## 2023-03-27 DIAGNOSIS — H01.006 BLEPHARITIS OF BOTH EYES, UNSPECIFIED EYELID, UNSPECIFIED TYPE: ICD-10-CM

## 2023-03-27 DIAGNOSIS — H01.003 BLEPHARITIS OF BOTH EYES, UNSPECIFIED EYELID, UNSPECIFIED TYPE: ICD-10-CM

## 2023-03-27 DIAGNOSIS — H02.889 MEIBOMIAN GLAND DYSFUNCTION: ICD-10-CM

## 2023-03-27 DIAGNOSIS — H00.12 CHALAZION OF RIGHT LOWER EYELID: Primary | ICD-10-CM

## 2023-03-27 PROCEDURE — 99213 OFFICE O/P EST LOW 20 MIN: CPT | Mod: GC | Performed by: OPHTHALMOLOGY

## 2023-03-27 RX ORDER — BUPROPION HYDROCHLORIDE 75 MG/1
TABLET ORAL
COMMUNITY
Start: 2023-03-17 | End: 2023-05-23

## 2023-03-27 RX ORDER — FLUOCINONIDE TOPICAL SOLUTION USP, 0.05% 0.5 MG/ML
SOLUTION TOPICAL
COMMUNITY
Start: 2023-02-16

## 2023-03-27 RX ORDER — NEOMYCIN SULFATE, POLYMYXIN B SULFATE AND DEXAMETHASONE 3.5; 10000; 1 MG/ML; [USP'U]/ML; MG/ML
1 SUSPENSION/ DROPS OPHTHALMIC 3 TIMES DAILY
Qty: 4 ML | Refills: 0 | Status: SHIPPED | OUTPATIENT
Start: 2023-03-27 | End: 2023-04-06

## 2023-03-27 ASSESSMENT — VISUAL ACUITY
OS_SC+: -2
METHOD: SNELLEN - LINEAR
OD_SC+: -1
OD_SC: 20/25
OS_SC: 20/20

## 2023-03-27 ASSESSMENT — TONOMETRY
OD_IOP_MMHG: 16
OS_IOP_MMHG: 18
IOP_METHOD: ICARE

## 2023-03-27 ASSESSMENT — EXTERNAL EXAM - RIGHT EYE: OD_EXAM: NORMAL

## 2023-03-27 ASSESSMENT — EXTERNAL EXAM - LEFT EYE: OS_EXAM: NORMAL

## 2023-03-27 NOTE — PATIENT INSTRUCTIONS
"Instructions for your chalazion / chalazia:  Most chalazia will resolve with treatment at home using warm compresses and massage to soften and drain them.  Follow these steps twice a day:     1.  Soak the eyelids for ten minutes with a hot wet cloth -- as hot as you can stand but not so hot that you burn yourself.  An easy way to make a long-lasting warm compress is to wrap a boiled egg or potato in a wet washcloth.  If you use the microwave to heat anything, be VERY CAREFUL that it is not too hot as microwaved foods and cloths can have very uneven hot spots that pose a burn hazard.       2.  After the eyelids are soft and refreshed from the hot compress, clean the debris from the glands at the bases of the eyelashes.  With a warm wet washcloth wrapped around your index finger, use the tip of your finger to vigorously scrub the bases of the eyelashes.  The principle is similar to brushing your teeth but here you can use a side-to-side motion.  Perform ten strokes per eyelid across the entire length of the eyelid. You can use plain water for this brushing but many patients claim better results if they use a dilute solution of one capful of Sai's Baby Shampoo in a glass of water.  This cleaning dislodges and removes the caked-in secretions in the gland and debris on the eyelids.  Do NOT wash the EYEBALL.     3.  If you have been prescribed an ointment, rub it on the eyelashes now.  Do NOT use Visine, Clear Eyes, or any \"anti-redness\" eye drops.  These can worsen your eye redness and irritation over time.     4.  Remember:  chalazia may take many WEEKS TO MONTHS to go away...so, hang in there and keep up with the compresses and scrubs!     5.  Diet & Supplements:  Modifying your diet helps reduce the chance of developing chalazia and possibly acne in some individuals.  This includes:  Avoid or decrease your intake of coffee, chocolate, refined sugars, and fried or processed foods. (Reduce gluten, breads, pastas, " and processed foods.)  Increase consumption of vegetables and fruits, fresh or lightly cooked. There is evidence  That Omega 3 supplementation will help as well. These are available at the local pharmacy or health food store. Dietary supplements with omega-3 fatty acids thin and decrease the inflammatory potential of the eyelid duct secretions decreasing your chance for recurrent chalazia in the future.  Omega-3 supplements are available from flax seeds, flax seed oil, or purified fish oil.  Supplement 500 - 1,500 mg of fish and/or flax seed oil daily for pre-adolescent children and 1,000 - 2,000 mg daily for adolescents and adults.  If you have any bleeding or cardiovascular problems or take prescription blood thinners, consult with your primary care physician before starting omega-3 supplements.      6.  Finally, if the chalazia persist despite following all the measures above consistently for at least 2 months, we can consider surgical removal.  In adults, this is performed as a 15 minute office procedure under local anesthesia. This necessitates general anesthesia in children, which we would much prefer to avoid if possible; so, again, please be diligent and patient with the above regimen.     7. If you have any questions please do not hesitate to contact us at [phone].

## 2023-03-27 NOTE — LETTER
3/27/2023         RE:  :  MRN: Celso Cates  1955  [id number]     Dear Maty,    Thank you for asking me to see your patient, Celso Cates, for an oculoplastic   consultation.  My assessment and plan are below.  For further details, please see my attached clinic note.      Assessment & Plan     Celso Cates is a 67 year old female with the following diagnoses:   1. Chalazion of right lower eyelid    2. Blepharitis of both eyes, unspecified eyelid, unspecified type    3. Meibomian gland dysfunction       Present for ~1 week  Improving with warm compresses    Continue warm compresses even after resolution of acute chalazion due to significant mgd  Gentle lid scrubs - discusses baby shampoo for sensitive skin  Return to clinic if no improvement with conservative therapy    Maxitrol three times a day x 10 d  Return to clinic in 1 month - I and D if no improvement       Again, thank you for allowing me to participate in the care of your patient.      Sincerely,    Duane Kearney MD  Department of Ophthalmology and Visual Neurosciences  [facility]    CC: Maty Zepeda, AKI  [address]  [facility] [address]  Via In Basket     Spring Fletcher MD  [address]  [facility] [address]  Via In Basket

## 2023-03-27 NOTE — NURSING NOTE
Chief Complaints and History of Present Illnesses   Patient presents with     Stye / Hordeolum Evaluation     Pt here for Stye right eye.      Chief Complaint(s) and History of Present Illness(es)     Stye / Hordeolum Evaluation            Associated symptoms: lid swelling and discharge    Comments: Pt here for Stye right eye.           Comments    Pt notes stye has gotten better since last week. Her last stye was on left eye and had to be drained. Denies pain but does itch and has discharge.   Pt using:  Doing daily warm compress - 1x daily.  lid scrub prn- hasn't been using- feels it isn't help.

## 2023-03-27 NOTE — PROGRESS NOTES
Chief Complaints and History of Present Illnesses   Patient presents with     Stye / Hordeolum Evaluation     Pt here for Stye right eye.      Chief Complaint(s) and History of Present Illness(es)     Stye / Hordeolum Evaluation    Associated symptoms include lid swelling and discharge. Additional   comments: Pt here for Stye right eye.            Comments    Pt notes stye has gotten better since last week. Her last stye was on left   eye and had to be drained. Denies pain but does itch and has discharge.   Pt using:  Doing daily warm compress - 1x daily.  lid scrub prn- hasn't been using- feels it isn't help.            Patient here for chalazion evaluation on right lower lid. She states it has been present for about 1 week. She has tried using warm compresses BID and has noticed some improvement in size. She has noticed increased drainage and discharge in that eye as well. Initially had some pain but it is not as painful now. She has noted some mild itching.         Assessment & Plan     Celso Cates is a 67 year old female with the following diagnoses:   1. Chalazion of right lower eyelid    2. Blepharitis of both eyes, unspecified eyelid, unspecified type    3. Meibomian gland dysfunction       Present for ~1 week  Improving with warm compresses    Continue warm compresses even after resolution of acute chalazion due to significant mgd  Gentle lid scrubs - discusses baby shampoo for sensitive skin  Return to clinic if no improvement with conservative therapy  Omega 3 daily  Maxitrol three times a day x 10 d  Return to clinic in 1 month - I and D if no improvement         Patricia Deluna MD  Resident Physician, PGY-2  Department of Ophthalmology      Attending Physician Attestation:  I have seen and examined this patient with the resident .  I have confirmed and edited as necessary the chief complaint(s), history of present illness, review of systems, relevant history, and examination findings as documented by  others.  I have personally reviewed the relevant tests, images, and reports as documented above.  I have confirmed and edited as necessary the assessment and plan and agree with this note.    - Duane Kearney MD 12:20 PM 3/29/2023

## 2023-03-31 NOTE — PROGRESS NOTES
Assessment & Plan     Generalized anxiety disorder  She has high anxiety.  A lot of this relates to her worry about losing more weight with the pain that she is having in her stomach which she thinks is related to the Wellbutrin.  I suggested she get back in touch with her psychiatrist to adjust the medications.  I also suggested stopping the Wellbutrin.  She is on such a low dose I doubt that it is making much difference.  I will go ahead and start gabapentin.  That may help with her anxiety as well as her sleep.  We will start at a very low dose of 50mg (liquid) at bedtime and increase to 100mg as tolerated.  - gabapentin (NEURONTIN) 100 MG capsule  Dispense: 60 capsule; Refill: 1    Patient called and talked to psych - because she is so intolerant of meds,  I prescribed gabapentin liquid 50mg or 1 ml at bedtime and increase to 100mg or 2 ml as tolerated.      Severe episode of recurrent major depressive disorder, without psychotic features (H)  We spent time talking about her depression and her feeling that she would be better dead and  she has no plan.  She does have a therapist she sees weekly.  We talked about some of the good things that are going on that she is gaining weight and that she has a grandchild close by that she can be joyful about.  She was given the suicide helpline number.  We also had a nurse Aleksandra do the [place] suicidal checklist and she talked to her and note placed by her .    Sarcopenia  This is improving.  She had a complete GI work-up and it was felt that  she was restricting calories.  With liberating some of this she has been gaining some weight.  She is has a good relationship with the GI doctor whom she saw just recently.  I will see her in follow-up in 3 months.      Return in about 3 months (around 7/3/2023) for Routine Visit.    Skylar Prajapati MD PhD  [facility]    Time note (e5, 40'): The total of my time (on the date of service) for  this service was 68 minutes, including discussion/face-to-face, chart review, interpretation not otherwise reported, documentation, and updating of the computerized record.      Lori Houston is a 67 year old, presenting for the following health issues:  Weight Problem    HPI  Last seen in 10/2022 - was having weight loss problems.  Had been seeing [facility].  She has overall been doing better with weight.  Her ideal weight is between 115 and 120.  Today she is at 112.  She is gaining about 2 pounds a month.  She has been seen by GI and had a thorough work-up all which was normal.  She then worked with a dietitian and basically was not getting enough calories.  She is work to increase her calories, she is snacking she is using sunflower seeds and she is gaining weight.    She is here today because of depression.  She started feeling depressed in December and she has been seeing a psychiatrist Dr. Regan Zabala who does mindfulness meditation.  She does not respond well to SSRIs and she has tried to antidepressants which did not work 1 was Viibryd and the other nortriptyline.  She currently is on mirtazapine to pain at night and this does help with sleep and anxiety.  She just recently started Wellbutrin 50 mg tablet and taking only half tablet.  However for the last 2 weeks she has been having abdominal discomfort mainly  bloating and pain mostly in the upper abdomen. She thinks is related to the Wellbutrin.  The last time she talked to her psychiatrist he suggested that she try gabapentin because she also is having back and leg pain.  He thought this might also help with her anxiety.  She is taking Ativan half tablet at night and half tablet twice during the day.  She has a good therapist she meets with weekly and that person is in [place].  She is very worried about her weight again with the stomach issues with  Wellbutrin.  She is doing physical therapy for 10 to 15 minutes a day, trying to walk  "some 2-3 short walks a day, she is doing meditation.  Her PHQ-9 is 14. Her  renata is 11  She does have harmful thoughts.   We talked about this.  She has no plan.  She just feels very depressed.  We also did the [place] suicidal questionnaire.    She also has hip and back pain.  She does have degenerative disc disease in the lower lumbar spine so  any activity causes pain.  She has seen a orthopedic specialist Dr. Lozano at [facility].  She had an MRI in August 2022.  Again she is doing physical therapy as noted above.    She lives alone, she has a rented house.  She has some friends in the neighborhood.  She moved here last year.  She retired and then moved here.  Her daughter has recently had a new baby grandson child 3 months old.  However she does not feel that she has enough strength to hold the baby.  She does see them frequently.     Saw Dr Toeny at [facility] 2 wks ago    - has food avoidance restrictive diet syndrom -  Mainly wants to start gabapentin           Review of Systems   Constitutional, HEENT, cardiovascular, pulmonary, GI, , musculoskeletal, neuro, skin, endocrine and psych systems are negative, except as otherwise noted.      Objective    LMP  (LMP Unknown)   Body mass index is 19.66 kg/m .  /71   Pulse 77   Ht 1.6 m (5' 3\")   Wt 50.3 kg (111 lb)   LMP  (LMP Unknown)   BMI 19.66 kg/m      Physical Exam   GENERAL: alert and anxious   NECK: no adenopathy, no asymmetry, masses, or scars and thyroid normal to palpation  RESP: lungs clear to auscultation - no rales, rhonchi or wheezes  CV: regular rate and rhythm, normal S1 S2, no S3 or S4, no murmur, click or rub, no peripheral edema   ABDOMEN: soft, nontender, no hepatosplenomegaly, no masses and bowel sounds normal  MS: no gross musculoskeletal defects noted, no edema  SKIN: no suspicious lesions or rashes  NEURO: Normal strength and tone, mentation intact and speech normal  PSYCH: mentation appears flat and sad     Skylar Prajapati " MD, PhD

## 2023-04-03 ENCOUNTER — TELEPHONE (OUTPATIENT)
Dept: OBGYN | Facility: CLINIC | Age: 68
End: 2023-04-03

## 2023-04-03 ENCOUNTER — OFFICE VISIT (OUTPATIENT)
Dept: FAMILY MEDICINE | Facility: CLINIC | Age: 68
End: 2023-04-03
Attending: FAMILY MEDICINE
Payer: COMMERCIAL

## 2023-04-03 VITALS
BODY MASS INDEX: 19.67 KG/M2 | WEIGHT: 111 LBS | HEART RATE: 77 BPM | DIASTOLIC BLOOD PRESSURE: 71 MMHG | SYSTOLIC BLOOD PRESSURE: 105 MMHG | HEIGHT: 63 IN

## 2023-04-03 DIAGNOSIS — F33.2 SEVERE EPISODE OF RECURRENT MAJOR DEPRESSIVE DISORDER, WITHOUT PSYCHOTIC FEATURES (H): ICD-10-CM

## 2023-04-03 DIAGNOSIS — F41.1 GENERALIZED ANXIETY DISORDER: Primary | ICD-10-CM

## 2023-04-03 DIAGNOSIS — M62.84 SARCOPENIA: ICD-10-CM

## 2023-04-03 PROCEDURE — 99417 PROLNG OP E/M EACH 15 MIN: CPT | Performed by: FAMILY MEDICINE

## 2023-04-03 PROCEDURE — 99215 OFFICE O/P EST HI 40 MIN: CPT | Performed by: FAMILY MEDICINE

## 2023-04-03 PROCEDURE — G0463 HOSPITAL OUTPT CLINIC VISIT: HCPCS | Performed by: FAMILY MEDICINE

## 2023-04-03 RX ORDER — BUPROPION HYDROCHLORIDE 100 MG/1
TABLET ORAL
COMMUNITY
Start: 2023-03-31 | End: 2023-05-23

## 2023-04-03 RX ORDER — GABAPENTIN 250 MG/5ML
SOLUTION ORAL
Qty: 25 ML | Refills: 1 | Status: SHIPPED | OUTPATIENT
Start: 2023-04-03 | End: 2023-08-10

## 2023-04-03 RX ORDER — GABAPENTIN 100 MG/1
CAPSULE ORAL
Qty: 60 CAPSULE | Refills: 1 | Status: SHIPPED | OUTPATIENT
Start: 2023-04-03 | End: 2023-05-23

## 2023-04-03 ASSESSMENT — COLUMBIA-SUICIDE SEVERITY RATING SCALE - C-SSRS
2. IN THE PAST MONTH, HAVE YOU ACTUALLY HAD ANY THOUGHTS OF KILLING YOURSELF?: NO
1. WITHIN THE PAST MONTH, HAVE YOU WISHED YOU WERE DEAD OR WISHED YOU COULD GO TO SLEEP AND NOT WAKE UP?: YES
6. HAVE YOU EVER DONE ANYTHING, STARTED TO DO ANYTHING, OR PREPARED TO DO ANYTHING TO END YOUR LIFE?: NO

## 2023-04-03 ASSESSMENT — ANXIETY QUESTIONNAIRES
2. NOT BEING ABLE TO STOP OR CONTROL WORRYING: MORE THAN HALF THE DAYS
3. WORRYING TOO MUCH ABOUT DIFFERENT THINGS: MORE THAN HALF THE DAYS
GAD7 TOTAL SCORE: 11
7. FEELING AFRAID AS IF SOMETHING AWFUL MIGHT HAPPEN: NOT AT ALL
IF YOU CHECKED OFF ANY PROBLEMS ON THIS QUESTIONNAIRE, HOW DIFFICULT HAVE THESE PROBLEMS MADE IT FOR YOU TO DO YOUR WORK, TAKE CARE OF THINGS AT HOME, OR GET ALONG WITH OTHER PEOPLE: VERY DIFFICULT
6. BECOMING EASILY ANNOYED OR IRRITABLE: SEVERAL DAYS
1. FEELING NERVOUS, ANXIOUS, OR ON EDGE: MORE THAN HALF THE DAYS
GAD7 TOTAL SCORE: 11
5. BEING SO RESTLESS THAT IT IS HARD TO SIT STILL: MORE THAN HALF THE DAYS

## 2023-04-03 ASSESSMENT — PATIENT HEALTH QUESTIONNAIRE - PHQ9
SUM OF ALL RESPONSES TO PHQ QUESTIONS 1-9: 14
5. POOR APPETITE OR OVEREATING: MORE THAN HALF THE DAYS

## 2023-04-03 NOTE — TELEPHONE ENCOUNTER
Patient called in after seeing Dr. Prajapati today.  Dr. Prajapati prescribed gabapentin.      Patient follows with a psychiatrist, and states she is very sensitive to medications.  He suggested that she start with a smaller dose of gabapentin, but 100mg is the smallest dose available in capsule form.  Patient is asking if she could have liquid gabapentin so she can start at 50mg and increase to 100mg, if she tolerates the 50mg.      [facility] [address]     Routed to Dr. Prajapati for review.

## 2023-04-03 NOTE — PATIENT INSTRUCTIONS
Stop the wellbutrin - Contact Dr Zabala about the medication  Start the gabapentin 100mg at bedtime - this should help with sleep and anxiety  Start eating walnuts  Continue walking and meditating    Think positively about the weight gain  See me in 3 months

## 2023-04-03 NOTE — NURSING NOTE
Chief Complaint   Patient presents with     Weight Problem     Depression Response    Patient completed the PHQ-9 assessment for depression and scored >9? Yes  Question 9 on the PHQ-9 was positive for suicidality? Yes  Does patient have current mental health provider? Yes    Is this a virtual visit? No    I personally notified the following: visit provider/clinic nurse.

## 2023-04-03 NOTE — PROGRESS NOTES
Depression Screening Follow-up        4/3/2023     8:55 AM   PHQ   PHQ-9 Total Score 14   Q9: Thoughts of better off dead/self-harm past 2 weeks Several days             4/3/2023     9:41 AM   C-SSRS (Brief [place])   Within the last month, have you wished you were dead or wished you could go to sleep and not wake up? Yes   Within the last month, have you had any actual thoughts of killing yourself? No   Within the last month, have you ever done anything, started to do anything, or prepared to do anything to end your life? No         Follow Up       Patient will schedule follow-up with her mental health provider. The patient reports having a good relationship with her provider and will follow up with them. Patient hs been meditating everyday and taking outdoor walks. The patient reports no previous or current plans of harming self but is struggling with depressive and sad thoughts.     Patient was provided with suicide hotline number on after visit summary and instructed to call if needed. Patient verbalized understanding and appreciation and will call the nurse line with additional questions or concerns about visit.    Crisis resource information provided in the After Visit Summary    Aleksandra Wooten RN

## 2023-04-06 ENCOUNTER — DOCUMENTATION ONLY (OUTPATIENT)
Dept: FAMILY MEDICINE | Facility: CLINIC | Age: 68
End: 2023-04-06
Payer: COMMERCIAL

## 2023-04-06 NOTE — PROGRESS NOTES
Type of Form Received: order    Form Received (Date) 4/6/23   Form Filled out Yes 4/12/23   Placed in provider folder Yes

## 2023-05-23 ENCOUNTER — VIRTUAL VISIT (OUTPATIENT)
Dept: PHARMACY | Facility: CLINIC | Age: 68
End: 2023-05-23
Payer: COMMERCIAL

## 2023-05-23 DIAGNOSIS — N89.8 VAGINAL DRYNESS: ICD-10-CM

## 2023-05-23 DIAGNOSIS — M54.9 CHRONIC BILATERAL BACK PAIN, UNSPECIFIED BACK LOCATION: ICD-10-CM

## 2023-05-23 DIAGNOSIS — G89.29 CHRONIC BILATERAL BACK PAIN, UNSPECIFIED BACK LOCATION: ICD-10-CM

## 2023-05-23 DIAGNOSIS — F33.1 MODERATE EPISODE OF RECURRENT MAJOR DEPRESSIVE DISORDER (H): Primary | ICD-10-CM

## 2023-05-23 DIAGNOSIS — R23.8 DRY SCALP: ICD-10-CM

## 2023-05-23 DIAGNOSIS — L71.9 ROSACEA: ICD-10-CM

## 2023-05-23 DIAGNOSIS — G47.00 INSOMNIA: ICD-10-CM

## 2023-05-23 DIAGNOSIS — Z78.9 TAKES DIETARY SUPPLEMENTS: ICD-10-CM

## 2023-05-23 DIAGNOSIS — R63.4 WEIGHT LOSS: ICD-10-CM

## 2023-05-23 DIAGNOSIS — F41.1 GAD (GENERALIZED ANXIETY DISORDER): ICD-10-CM

## 2023-05-23 DIAGNOSIS — G47.09 OTHER INSOMNIA: ICD-10-CM

## 2023-05-23 PROCEDURE — 99605 MTMS BY PHARM NP 15 MIN: CPT | Mod: VID | Performed by: PHARMACIST

## 2023-05-23 PROCEDURE — 99607 MTMS BY PHARM ADDL 15 MIN: CPT | Mod: VID | Performed by: PHARMACIST

## 2023-05-23 RX ORDER — METRONIDAZOLE 10 MG/G
1 GEL TOPICAL EVERY MORNING
COMMUNITY
Start: 2022-12-12 | End: 2023-08-10

## 2023-05-23 RX ORDER — ESZOPICLONE 1 MG/1
.5-1 TABLET, FILM COATED ORAL AT BEDTIME
COMMUNITY

## 2023-05-23 NOTE — LETTER
Faxed request to nurse at [facility] to advise how insulin is being taken   May 25, 2023  Celso Cates  [address] [facility] [address]    Dear Ms. Cates, [facility]     Thank you for talking with me on May 23, 2023 about your health and medications. As a follow-up to our conversation, I have included two documents:      1. Your Recommended To-Do List has steps you should take to get the best results from your medications.  2. Your Medication List will help you keep track of your medications and how to take them.    If you want to talk about these documents, please call Daisy Swenson PharmD at phone: [phone], Monday-Friday 8-4:30pm.    I look forward to working with you and your doctors to make sure your medications work well for you.    Sincerely,  Daisy Swenson PharmD  [facility] Pharmacist, [facility]

## 2023-05-23 NOTE — PROGRESS NOTES
Medication Therapy Management (MTM) Encounter    ASSESSMENT:                            Medication Adherence/Access: No issues identified    Depression/Anxiety: Ok to start taking gabapentin if she wants to. She has not taken many antidepressant medications and sounds as though dyskinesia occurred with escitalopram and higher doses of bupropion (>50mg). Her increased movements mid morning could be related to bupropion peak effect. Could consider longer acting or twice daily bupropion to limit peak effect and see if that improves movements.  Compounding pharmacy does make bupropion SR capsules.    Could also consider switching bupropion to a different medication. She has only been taking bupropion for 2 months and has valued the improved sleep. Discussed balancing benefit and side effects in determining if continuing the med is worth the movements, if they do improve with changed dosing/formulations, as above. She may benefit from switching to duloxetine that could also be helpful for pain.    Insomnia/RLS:  Improved sleep since starting bupropion, which may indicate that depression/anxiety had been impacting sleep quality and causing early waking. Physical activity is limited by pain, though may also improve sleep quality. Hopefully continued PT will be helpful. Will continue monitoring.     Weight loss: Improved.  Hip/back pain: Seems stable. Will continue to monitor.    Vaginal dryness: Stable. Continue current medication.  Dry scalp: Recommended continued use if she can tolerate in order to assess longer trial. Follow up with prescriber if burning continues.    Rosacea:  Plan in place for follow up.  Supplements: Stable. Continue current medication.    PLAN:                            -May consider switching to bupropion SR or dividing IR twice daily, with second dose in early afternoon to avoid impact on sleep quality  -May consider switching bupropion to duloxetine  -Ok to start gabapentin    Patient will  "talk with psychiatry about these options    Follow-up: will schedule upon seeing psychiatry    SUBJECTIVE/OBJECTIVE:                          Celso Cates is a 67 year old female contacted via secure video for an initial visit. She was self-referred to me.      Psychiatry: Dr. Regan [facility]     Reason for visit: med review.    Allergies/ADRs: Reviewed in chart  Past Medical History: Reviewed in chart  Tobacco: She reports that she has never smoked. She has never used smokeless tobacco.  Alcohol: not currently using    Medication Adherence/Access: no issues reported    Depression/Anxiety: Pt is currently taking bupropion IR 50mg daily, mirtazapine 7.5mg at bedtime, lorazepam 0.25mg TID (11am, 5pm, 10pm). Pt also recently picked up gabapentin 250mg/5ml, to take 50mg at bedtime, but hasn't yet started taking it.   She practices mindfulness meditation daily. She is also seeing health psychology to help with nervous system overactivation, which started last year. Also doing physical therapy.  She had tried clonazepam for about 15 years (stopped about 6 years ago), which was very helpful for depression and anxiety, but was stopped due to not wanting to be on it for long term.    Overall, she had tried antidepressants in the past and has had dyskinesia, describing \"tongue sucking\" and tongue and jaw spasms. Feels that these may occur more often with higher anxiety/stressors.     -Has felt that mirtazapine has been helpful for anxiety. She started it last year and increased to 17.5mg, but was having more muscle tightness and dyskinesia and reducing back to 7.5mg has been helpful.    -Started bupropion 3/2023 and feels it has been helpful for overall mood and has noticed better sleep, but had return of some mouth movements when dose was increased from 25mg to 50mg daily. She goes to bed around 10pm and usually feels rested. Since starting bupropion, she is able to sleep about an hour longer, but lays in bed " for about 30m before getting up.    Seems to have more movements around 10am (takes bupropion at 7am), then better after lorazepam at 11am. She doesn't usually notice the movements in the evenings, but also takes lorazepam at 5pm as well.    She noticed difficult depression started last fall and through this last winter. Anxiety seems to come in waves and can be triggered by something, but is sometimes random. She described first noticing worsened symptoms last year when she was working through GI issues and pain, which caused her to need to retire early. She did have some panic last spring, triggered by increased pain she was having. She then developed upper GI symptoms.     Past Medication Trials    Medications Max dose Dates/Duration Discontinuation Reason Other Notes   vilazodone  1/2023 x 1 day GI upset, diarrhea    nortriptyline  10/2023, 1 dose dizziness    escitalopram  2008 x couple weeks Mouth dyskinesia (tongue movements/spasms)    clonazepam  [id number] Wanted to discontinue benzo    Bupropion  50mg 3/2023-current  Slight agitation with dose increase, then improved;  Some mouth movements       Insomnia/RLS:  Pt has Lunesta 1mg, generally taking 0.5mg every few nights, and melatonin ER 9mg at bedtime. She has been falling and staying asleep better since starting bupropion, so has been needing it less.  She had been waking up very early (3am), but this is happening less since starting bupropion. When she does still wake up early, she will then take Lunesta, but never feels extra tired in the morning.     Notices restless legs if she stays up too late, which keep her from falling asleep, but not as issue if she goes to sleep earlier. She notices it 1-2 times per week. She may have it for a few nights in a row, but then not again for awhile. Doesn't seem to be tied to more or less physical activity during the day.    Weight loss: Notes indicate concern with weight loss over the last year. GI workup was  unremarkable and dietician has helped increase caloric intake. She has had concerns with stomach upset from various meds and worry that it will again cause unintentional weight loss.    Hip/back pain: Pt has lumbar degenerative disc disease and has discomfort with physical activity. Doesn't generally take medication.    Vaginal dryness: Pt uses estradiol cream nightly, which has been quite helpful. No issues.    Dry scalp: Pt uses flucinonide solution for the last week for dermatitis on her scalp. Hasn't seen any benefit yet and sometimes feels burning sensation.    Rosacea:  Pt has been applying Metrogel for about 9 months and doesn't seem helpful. Is working with dermatology.     Supplements: Pt takes calcium/Vitamin D/Mg supplement daily. No issues.  ----------------    I spent 60 minutes with this patient today. A copy of the visit note was provided to the patient's provider(s).    A summary of these recommendations was sent via clinic portal.    Daisy Swenson, PharmD  Medication Therapy Management Pharmacist  [facility]    Telemedicine Visit Details  Type of service:  Video Conference via [employer]  Start Time: 10:00am  End Time: 11:00am     Medication Therapy Recommendations  Moderate episode of recurrent major depressive disorder (H)    Current Medication: BUPROPION HCL PO   Rationale: Undesirable effect - Adverse medication event - Safety   Recommendation: Provide Education - consider trying SR forumulation - talk with psychiatry   Status: Patient Agreed - Adherence/Education

## 2023-05-23 NOTE — LETTER
"Recommended To-Do List      Prepared on: May 23, 2023       You can get the best results from your medications by completing the items on this \"To-Do List.\"      Bring your To-Do List when you go to your doctor. And, share it with your family or caregivers.    My To-Do List:  What we talked about: What I should do:   An issue with your medication    Education: Talk with Dr. Zabala about switching bupropion to sustained release formulation          What we talked about: What I should do:                     "

## 2023-05-23 NOTE — LETTER
_  Medication List        Prepared on: May 23, 2023     Bring your Medication List when you go to the doctor, hospital, or   emergency room. And, share it with your family or caregivers.     Note any changes to how you take your medications.  Cross out medications when you no longer use them.    Medication How I take it Why I use it Prescriber   BUPROPION HCL PO Take 50 mg by mouth daily Compounded acid-resistant capsules  Depression Dr. Regan Zabala   Calcium-Magnesium-Vitamin D (CALCIUM MAGNESIUM PO) Take 1 tablet by mouth daily General health Self   ESTRADIOL 0.1MG/GM CREAM Place 1 g vaginally At Bedtime Vaginal dryness Dr. Skylar Prajapati   eszopiclone (LUNESTA) 1 MG tablet Take 0.5-1 mg by mouth At Bedtime Insomnia Dr. Regan Zabala   fluocinonide (LIDEX) 0.05 % external solution APPLY TO THE SCALP TWICE A DAY Dry scalp Dr. Jessica Pérez   gabapentin (NEURONTIN) 250 MG/5ML solution Take 1 ml (50mg) at bedtime and increase to 2ml (100mg) at bedtime. As tolerated Generalized Anxiety Disorder Skylar Prajapati MD PhD   LORazepam (ATIVAN) 0.5 MG tablet Take 0.25 mg by mouth 3 times daily  Anxiety Dr. Regan Zabala   melatonin ER 3 MG tablet Take 9 mg by mouth At Bedtime  Insomnia Self   metroNIDAZOLE (METROGEL) 1 % external gel Apply 1 Application topically every morning Rosacea Dr. Jessica Pérez   mirtazapine (REMERON) 7.5 MG tablet Take 7.5 mg by mouth At Bedtime Depression/Anxiety Dr. Regan Zabala         Add new medications, over-the-counter drugs, herbals, vitamins, or  minerals in the blank rows below.    Medication How I take it Why I use it Prescriber                                      Allergies:      No Known Allergies        Side effects I have had:               Other Information:              My notes and questions:

## 2023-05-23 NOTE — Clinical Note
Hello, This patient scheduled MTM with me and I met with her this week to review meds. She will follow up with psychiatry and then back with me. Please see as fyi and let me know if you have questions. Thanks, Daisy Swenson, PharmD Medication Therapy Management Pharmacist [facility]

## 2023-05-25 RX ORDER — CHOLECALCIFEROL (VITAMIN D3) 25 MCG
3 TABLET ORAL AT BEDTIME
COMMUNITY

## 2023-05-25 NOTE — PATIENT INSTRUCTIONS
"Recommendations from today's MTM visit:                                                    MTM (medication therapy management) is a service provided by a clinical pharmacist designed to help you get the most of out of your medicines.   Today we reviewed what your medicines are for, how to know if they are working, that your medicines are safe and how to make your medicine regimen as easy as possible.      Talk with psychiatry about:  -May consider switching to bupropion SR or dividing IR twice daily, with second dose in early afternoon to avoid impact on sleep quality  -May consider switching bupropion to duloxetine    Follow-up: after psychiatry    It was great speaking with you today.  I value your experience and would be very thankful for your time in providing feedback in our clinic survey. In the next few days, you may receive an email or text message from [employer] with a link to a survey related to your  clinical pharmacist.\"     To schedule another MTM appointment, please call the clinic directly or you may call the MTM scheduling line at [phone] or toll-free at [phone].     My Clinical Pharmacist's contact information:                                                      Please feel free to contact me with any questions or concerns you have.      Daisy Swenson, PharmD  Medication Therapy Management Pharmacist  [facility]  "

## 2023-05-30 ENCOUNTER — MYC REFILL (OUTPATIENT)
Dept: FAMILY MEDICINE | Facility: CLINIC | Age: 68
End: 2023-05-30
Payer: COMMERCIAL

## 2023-05-30 ENCOUNTER — PATIENT OUTREACH (OUTPATIENT)
Dept: CARE COORDINATION | Facility: CLINIC | Age: 68
End: 2023-05-30
Payer: COMMERCIAL

## 2023-05-30 DIAGNOSIS — N95.2 ATROPHIC VAGINITIS: Primary | ICD-10-CM

## 2023-05-30 NOTE — TELEPHONE ENCOUNTER
Refill given  Annual physical needed in July or [place] this year.   Limited refill given    Please offer complete physical appointment with me in next 1-2 month.    Thanks

## 2023-06-07 ENCOUNTER — THERAPY VISIT (OUTPATIENT)
Dept: OCCUPATIONAL THERAPY | Facility: CLINIC | Age: 68
End: 2023-06-07
Payer: COMMERCIAL

## 2023-06-07 DIAGNOSIS — M79.642 PAIN IN BOTH HANDS: ICD-10-CM

## 2023-06-07 DIAGNOSIS — M79.641 PAIN IN BOTH HANDS: ICD-10-CM

## 2023-06-07 PROCEDURE — 97166 OT EVAL MOD COMPLEX 45 MIN: CPT | Mod: GO | Performed by: OCCUPATIONAL THERAPIST

## 2023-06-07 PROCEDURE — 97110 THERAPEUTIC EXERCISES: CPT | Mod: GO | Performed by: OCCUPATIONAL THERAPIST

## 2023-06-07 PROCEDURE — 97535 SELF CARE MNGMENT TRAINING: CPT | Mod: GO | Performed by: OCCUPATIONAL THERAPIST

## 2023-06-07 NOTE — PROGRESS NOTES
OCCUPATIONAL THERAPY EVALUATION  Type of Visit: Evaluation    See electronic medical record for Abuse and Falls Screening details.    Subjective       The thumb has been doing better. R UE pain getting better - working with a new PT. Wondering about the L hand. Started getting back into more computer work overall.  There are things that are easier to do, not so difficult. Also doing some work with pain for hip and back.  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 Has an affiliation with the [employer] history department.    Pt is working with various modalities to address nervous sytem dysregulation. Working with [employer] method. There may be some inflammation. Pt is very sensitive to pain.  Currently working in normal job without restrictions. Works from home part time. Working ~1/2 days, from home. Advising some students, writing an article. Takes frequent breaks. Uses standing/seated desk.  Small keyboard works well. Likes the [employer] mouse.  Leisure activities/hobbies: has been working on knitting (wants to complete a baby blanket)    Work: Has control over work and schedule. Typing slower seems to help.  Living Environment  Social support: Alone   Type of home: House   Stairs to enter the home:         Ramp: No   Stairs inside the home: Yes       Help at home: Self Cares (home health aide/personal care attendant, family, etc)   Has someone who comes 4 hours a week to do things that she needs help  with.  Equipment owned:   none    Employment: No    Hobbies/Interests: walking meditation reading    Patient goals for therapy: type on computer    Occupational Profile Information:  Right hand dominant    Objective:  Pain Level (Scale 0-10)   7/6/2022 9/28/2022 6/7/2023     At Rest 0 0 0   With Use 5 Mild, but can vary Mild, can vary     Pain Description  Date 7/6/2022 6/7/2023     Location Base of the thumb L hand. Dorsal central, around the MPj, into the fingers, into the IF in particular to the PIPj.    Pain Quality Sharp, Shooting and Tender.  Its nagging - tootie with typing    Frequency intermittent      Pain is worst  daytime    Exacerbated by  certain motions, , turn.  Stretch and press with the R thumb. Trying to use L thumb on spacebar Only happens with typing.   Hands will feel tired after chopping vegetables for a while, things will be achy.   Relieved by Rest. Tylenol seems to help a bit - does not take ibuprofen    Progression  slowly improving possibly      Edema  Plan to further assess    Sensation   6/7/2023  No over c/o paresthesias to the hands this date    AROM:   B wrists are WNL all planes    ROM  Thumb 7/6/2022 7/6/2022 9/28/2022 6/7/2023     AROM  (PROM) R L R bilaterally   MP /50 /55 40    IP /32 /45 46    RABD 35 26     PABD 35, pain ~10 sec after 40 41    Retropulsion       Kapandji Opposition Scale (0-10/10)    Doing well functionally with B thumbs       Palpation:   Tenderness / pain Report: - none  + mild    ++ moderate    +++ severe      7/6/2022 7/6/2022 9/28/2022 6/7/2023     Location Right Left  R   Radial thumb CMCj + -     Volar thumb CMC joint + - + tender    Thumb APB - mid muscle + -     Scaphoid tubercle - -     1st dorsal compartment - -     Extensor wad + at EDC/ECRB/L  Has been really painful to upper posterior quadrant, lateral arm, extensor wad Note that the R UE pain has improved, has a great PT.       Palpation:   Pain Report:  - none    + mild    ++ moderate     +++ severe     8/2/2022      Right   Traps - upper trap, and upper medial +   Supraspinatus on scapula ++   Triangular Interval  (between long and lateral heads of triceps and just inferior to teres major) +   Anterior shoulder +   Lateral shoulder +         Strength:  Pain-free /Pinch Test  Testing deferred      Assessment & Plan   CLINICAL IMPRESSIONS   Medical Diagnosis: B hand pain and stiffness    Treatment Diagnosis: B hand pain and stiffness    Impression/Assessment: Pt is a 67 year old female presenting to Occupational Therapy due to B hand pain and stiffness, L>R.  The following significant findings have been identified: Pain.  These identified deficits interfere with their ability to perform self care tasks, work tasks and household chores as compared to previous level of function.     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, meal preparation and cleanup and work  Clinical Decision Making (Complexity): Moderate complexity    PLAN OF CARE  Treatment Interventions:   Modalities:  Paraffin  Therapeutic Exercise:  AROM, Tendon Gliding, Contract Relax, Isotonics, Isometrics and Stabilization  Neuromuscular re-education:  Kinesthetic Training and Proprioceptive Training  Manual Techniques:  Joint mobilization, Myofascial release and Manual edema mobilization  Orthotic Fabrication:  Static, Finger based and Hand based  Self Care:  Self Care Tasks, Ergonomic Considerations and Work Tasks    Long Term Goals   OT Goal 1  Goal Identifier: IADLs  Goal Description: Pt will perform typing pain free to bilateral hands  Rationale:  (In order to optimize function and perform IADLs with no pain.)  Target Date: 09/07/23      Frequency of Treatment: 2 times per month  Duration of Treatment: 3 months     Recommended Referrals to Other Professionals: none  Education Assessment:        Risks and benefits of evaluation/treatment have been explained.   Patient/Family/caregiver agrees with Plan of Care.     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 Like the mushroom exercise in [facility]  Radial abduction finger walking.  Updated PTRx    Prior therapy:  Has a Right small ottobock orthosis, prefers neoprene wrap - wears sometimes.  Avoid positions of thumb irritation  Provided with joint protection handout  Gave up on knitting   1st DA AROM  Had home ergo assessment  eVropallo comb mouse. Uses apple KB - smaller KB works well    Referring Provider:  Referred Self    Initial Assessment  See Epic Evaluation- 06/07/23                 [facility]                                                                                   OUTPATIENT OCCUPATIONAL THERAPY      PLAN OF TREATMENT FOR OUTPATIENT REHABILITATION   Patient's Last Name, First Name, RTACIVIKRAM GeorgeonCelso    YOB: 1955   Provider's Name   [facility]   Medical Record No.  [id number]     Onset Date: 06/07/23 Start of Care Date: 06/07/23     Medical Diagnosis:  B hand pain and stiffness      OT Treatment Diagnosis:  B hand pain and stiffness Plan of Treatment  Frequency/Duration:2 times per month/3 months    Certification date from 06/07/23   To 09/03/23        See note for plan of treatment details and functional goals     Lyn Gibbs OT                         I CERTIFY THE NEED FOR THESE SERVICES FURNISHED UNDER        THIS PLAN OF TREATMENT AND WHILE UNDER MY CARE     (Physician attestation of this document indicates review and certification of the therapy plan).              MD: UNIQUE  AMILCAR

## 2023-06-09 PROBLEM — M79.642 PAIN IN BOTH HANDS: Status: ACTIVE | Noted: 2023-06-09

## 2023-06-09 PROBLEM — M79.641 PAIN IN BOTH HANDS: Status: ACTIVE | Noted: 2023-06-09

## 2023-06-16 ENCOUNTER — TRANSFERRED RECORDS (OUTPATIENT)
Dept: HEALTH INFORMATION MANAGEMENT | Facility: CLINIC | Age: 68
End: 2023-06-16
Payer: COMMERCIAL

## 2023-07-27 PROBLEM — M79.641 PAIN IN BOTH HANDS: Status: RESOLVED | Noted: 2023-06-09 | Resolved: 2023-07-27

## 2023-07-27 PROBLEM — M79.642 PAIN IN BOTH HANDS: Status: RESOLVED | Noted: 2023-06-09 | Resolved: 2023-07-27

## 2023-08-08 ENCOUNTER — OFFICE VISIT (OUTPATIENT)
Dept: FAMILY MEDICINE | Facility: CLINIC | Age: 68
End: 2023-08-08
Payer: COMMERCIAL

## 2023-08-08 VITALS
BODY MASS INDEX: 19.99 KG/M2 | DIASTOLIC BLOOD PRESSURE: 85 MMHG | SYSTOLIC BLOOD PRESSURE: 121 MMHG | HEART RATE: 78 BPM | WEIGHT: 117.08 LBS | OXYGEN SATURATION: 98 % | HEIGHT: 64 IN | TEMPERATURE: 97.9 F

## 2023-08-08 DIAGNOSIS — F41.1 GAD (GENERALIZED ANXIETY DISORDER): Chronic | ICD-10-CM

## 2023-08-08 DIAGNOSIS — R53.82 CHRONIC FATIGUE: Primary | Chronic | ICD-10-CM

## 2023-08-08 PROBLEM — F32.A DEPRESSION: Status: ACTIVE | Noted: 2023-08-08

## 2023-08-08 PROBLEM — H33.312 RETINAL TEAR, LEFT: Status: ACTIVE | Noted: 2021-12-22

## 2023-08-08 PROBLEM — F32.A DEPRESSION: Chronic | Status: ACTIVE | Noted: 2023-08-08

## 2023-08-08 PROBLEM — D17.30 LIPOMA OF SKIN AND SUBCUTANEOUS TISSUE: Status: RESOLVED | Noted: 2022-08-12 | Resolved: 2023-08-08

## 2023-08-08 PROBLEM — M18.11 ARTHRITIS OF CARPOMETACARPAL (CMC) JOINT OF RIGHT THUMB: Chronic | Status: ACTIVE | Noted: 2022-08-12

## 2023-08-08 PROBLEM — M51.26 LUMBAR DISC HERNIATION: Status: ACTIVE | Noted: 2023-08-08

## 2023-08-08 PROBLEM — K57.30 DIVERTICULOSIS, SIGMOID: Status: ACTIVE | Noted: 2022-02-07

## 2023-08-08 PROBLEM — M51.26 LUMBAR DISC HERNIATION: Chronic | Status: ACTIVE | Noted: 2023-08-08

## 2023-08-08 PROBLEM — M85.89 OSTEOPENIA OF MULTIPLE SITES: Chronic | Status: ACTIVE | Noted: 2022-08-01

## 2023-08-08 NOTE — PROGRESS NOTES
"ASSESSMENT AND PLAN:     (R53.82) Chronic fatigue  (primary encounter diagnosis)  (F41.1) MARIELY (generalized anxiety disorder)  Comment: Chronic, not at goal.    One year of low energy, worsened anxiety/hypervigilance, GI symptoms, body pains, unrefreshing sleep, starting following the unexpected death of her father.    Has had a broad workup that has so far been unrevealing.     Differential is broad and includes:  CFS - Gave Depaul symptom questionnaire short-form to complete and return via [email or web address].    EMILI - had a newly elevated blood CO2 level on labs in July. Will repeat when she returns in September and we'll go through the ESS and STOP-BANG questionnaires to see if sleep testing might be warranted.    Complex Grief / PTSD - Unprompted, Celso used the word \"Hypervigilant\" to describe her anxiety. I plan to ask her at the next visit if she's tried an selective serotonin reuptake inhibitor or discussed trauma/PTSD with her mental health team.    Ultimately, I expect the odds of us finding a single unifying diagnosis to explain all of our symptoms to be low without new symptoms or objective data coming to light, and that we should take the approach of helping Celso live with her symptoms with the expectation that they will likely gradually improve with time as her weight and some of her GI symptoms have.       Review of prior external note(s) from - CareEverywhere information from [facility] reviewed  Review of the result(s) of each unique test - see full list in body of note  I spent a total of 40 minutes on the day of the visit.   Time spent by me doing chart review, history and exam, documentation and further activities per the note        Umberto Rendon MD   [facility]  08/08/2023, 11:53 AM      SUBJECTIVE:   Celso is a 67 year old female who presents to clinic today to establish care and to discuss the following problem(s).       - very active person prior to last " "year    - father  from sepsis during the pandemic (2022)  - soon thereafter starting getting severe abdominal pain, waking her up in the night  - develop back pain. Had an injury in yoga class that sent her to the emergency room  - after the ED visit, developed severe anxiety and feeling that she could not digest food  - stopped doing yoga due to fear of moving her body and having more pain    - as a result of all these symptoms, she ended up retiring earlier than planned  - also moved from [place] to [place] to be closer to [facility] and hope that medical care would be better  - realized after the fact that all these changes were \"like throwing logs on the fire\" and adding to her stressors.    - feels hypervigilant at times, anxiety ramps up, worries ramps up  - at other times feels fatigued  - sleep is not restful  - has a lot of body pain    - with GI symptoms, would feel full quickly and when full would have pain  - switched to a pureed/liquid diet  - digestive symptoms have started to settle down    - follows with health psychologist at [facility] in [place] who specializes in the nervous system, polyvagal syndrome  - learning techniques to get more stable when she notices body ramping up  - learning to rest and relax rather than pushing through when fatigue more severe    - has GI at [facility] (Leigh Toney) who specializes in functional GI disorders  - in 10/2022 had multiple endoscopies, colonoscopy  - diagnosed as \"visceral hypersensitvity\" and \"food avoidance restrictive diet\"    - working with PT at [facility] in [place]  - PT specializes in neurologic disorders    - takes lunesta at bedtime 1mg PRN - more recently this past month    Lorazepam 0.25mg three times a day    - follows with psychiatry Regan Quiles  - weaning down on mirtazapine slowly    - meditates daily  - does a movement technique, gentle body work    - has had flaking, " itching, and irritation in scalp  - dermatologist prescribed fluocinonide topical    Review of Systems:   Constitutional - had a lot of unintentional weight loss over the past year, is pretty much back to her normal weight now, no night sweats   Eyes - no vision concerns   Ears/Nose/Throat - no hearing concerns, no dysphagia   Cardiovascular - typically BP runs low, was high when anxiety started to get high, and has improved recently. When she has anxiety she will have a more rapid heart beat. Elevated heart rate is never the first thing to come on. Does not typically have rapid heart beat with position changes. Will sometimes have worse fatigue with going from lying to standing, but not presyncopal symptoms.    Pulmonary - no shortness of breath, wheezing, coughing   GI - on and off abdominal pains still, has a BM in the morning and then during the day 2-3 small Bms, takes 1 capful of miralax every night. No nausea/vomiting.     - some urine frequency/urgency, has had microscopic hematuria in the past,  no dysuria   Musculoskeletal - pain in lower back (bilateral), right hip radiating into right leg, right biceps (seeing [facility] next week for this), right neck and shoulder, pain in hands using computer and stiffness (especially MCPs and PIPs seeing hand therapy for this), right-hand dominant  Integument - scalp flaking and itching, no other skin changes, follows with outside dermatology   Neuro - intermittent numbness and tingling in right shin and a little in right toes (saw neurology for this in August 2022 - did MRI which showed lumbar nerve root compression)   Heme - bruises easily, no bleeding    07/21/23 UA no protein, 0-2 RBCs, 0 WBCs  07/21/23 BMP normal other than CO2 30, LFTs normal  07/21/23 CK 63  07/21/23 WBC 3.6, ANC 2.1, Hgb 14.5, MCV 90, Platelets 237  07/21/23 ESR 5  07/21/23 B12 469, Vit D 44.7  07/21/23 Lyme screen negative  07/21/23 TSH 1.31    Four most recent WBCs prior to 2023  are 4.2 (01/2022), 4.0 (06/2021), 4.2 (10/2015), 8.2 (07/2015)    09/16/22 Endoscopic US: normal  03/03/22 CT abdomen/pelvis w/o contrast: small fat-containing abdominal wall hernia at umbilicus, otherwise normal  02/07/22 Colonoscopy: diverticulosis, tortuous colon, 4mm TA, otherwise normal      Past Medical History:   Diagnosis Date     Depression      MARIELY (generalized anxiety disorder)      Lumbar disc herniation      OAB (overactive bladder)      Osteopenia      Retinal tear of left eye      Squamous cell carcinoma of hand     mohs procedure     Third degree uterine prolapse     repair in 1/2014     Past Surgical History:   Procedure Laterality Date     Da Karen laparoscopic sacral colpopexy, supracervical hysterectomy, retropubic midurethral sling.  01/07/2014     LASIK       MOHS MICROGRAPHIC PROCEDURE  10/01/2013    left ankle, right ankle, left hand     RETINAL REATTACHMENT Left 2022     Family History   Problem Relation Age of Onset     Breast Cancer Mother         late 40's, negative BRCA     Uterine Cancer Mother      Hypertension Father      Glaucoma Father      Heart Failure Father      Osteoarthritis Sister      Atrial fibrillation Brother      Gout Brother      Neurologic Disorder Maternal Grandfather         We think MS, but not sure     Heart Disease Paternal Grandfather      Breast Cancer Maternal Aunt 70     Diabetes No family hx of      Macular Degeneration No family hx of      Ovarian Cancer No family hx of      Colon Cancer No family hx of      Autoimmune Disease No family hx of      Social History     Tobacco Use     Smoking status: Never     Smokeless tobacco: Never   Substance Use Topics     Alcohol use: Not Currently     Drug use: No     Social History     Social History Narrative    Retired     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[place], 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    Works remotely part-time    Lives alone    Has daughter and granddaughter in [facility]     "Has close friends in her neighborhood       Current Outpatient Medications   Medication     BUPROPION HCL PO     Calcium-Magnesium-Vitamin D (CALCIUM MAGNESIUM PO)     ESTRADIOL 0.1MG/GM CREAM     eszopiclone (LUNESTA) 1 MG tablet     fluocinonide (LIDEX) 0.05 % external solution     LORazepam (ATIVAN) 0.5 MG tablet     melatonin ER 3 MG tablet     mirtazapine (REMERON) 7.5 MG tablet     Polyethylene Glycol 3350 (MIRALAX PO)     gabapentin (NEURONTIN) 250 MG/5ML solution     metroNIDAZOLE (METROGEL) 1 % external gel     No current facility-administered medications for this visit.     I have reviewed the patient's past medical, surgical, family, and social history.     OBJECTIVE:   /85   Pulse 78   Temp 97.9  F (36.6  C)   Ht 1.627 m (5' 4.06\")   Wt 53.1 kg (117 lb 1.3 oz)   LMP  (LMP Unknown)   SpO2 98%   BMI 20.06 kg/m      Constitutional: well-appearing, appears stated age  Eyes: conjunctivae without erythema, sclera anicteric.   Skin: no rashes, lesions, or wounds  Psych: affect is full and appropriate, speech is fluent and non-pressured  "

## 2023-08-10 PROBLEM — M54.41 CHRONIC BILATERAL LOW BACK PAIN WITH RIGHT-SIDED SCIATICA: Chronic | Status: ACTIVE | Noted: 2023-08-10

## 2023-08-10 PROBLEM — R63.4 WEIGHT LOSS: Status: RESOLVED | Noted: 2022-09-13 | Resolved: 2023-08-10

## 2023-08-10 PROBLEM — K57.30 DIVERTICULOSIS, SIGMOID: Status: RESOLVED | Noted: 2022-02-07 | Resolved: 2023-08-10

## 2023-08-10 PROBLEM — G89.29 CHRONIC BILATERAL LOW BACK PAIN WITH RIGHT-SIDED SCIATICA: Chronic | Status: ACTIVE | Noted: 2023-08-10

## 2023-08-10 PROBLEM — M25.511 RIGHT ANTERIOR SHOULDER PAIN: Status: RESOLVED | Noted: 2022-08-01 | Resolved: 2023-08-10

## 2023-08-10 PROBLEM — H33.312 RETINAL TEAR, LEFT: Status: RESOLVED | Noted: 2021-12-22 | Resolved: 2023-08-10

## 2023-08-10 PROBLEM — M25.551 BILATERAL HIP PAIN: Status: RESOLVED | Noted: 2022-05-06 | Resolved: 2023-08-10

## 2023-08-10 PROBLEM — M25.552 BILATERAL HIP PAIN: Status: RESOLVED | Noted: 2022-05-06 | Resolved: 2023-08-10

## 2023-08-10 PROBLEM — R53.82 CHRONIC FATIGUE: Chronic | Status: ACTIVE | Noted: 2023-08-10

## 2023-08-10 PROBLEM — R10.13 ABDOMINAL PAIN, EPIGASTRIC: Status: RESOLVED | Noted: 2022-09-13 | Resolved: 2023-08-10

## 2023-08-10 PROBLEM — M53.3 SI (SACROILIAC) JOINT DYSFUNCTION: Status: RESOLVED | Noted: 2022-08-12 | Resolved: 2023-08-10

## 2023-08-15 ENCOUNTER — TRANSFERRED RECORDS (OUTPATIENT)
Dept: HEALTH INFORMATION MANAGEMENT | Facility: CLINIC | Age: 68
End: 2023-08-15
Payer: COMMERCIAL

## 2023-08-26 ENCOUNTER — HEALTH MAINTENANCE LETTER (OUTPATIENT)
Age: 68
End: 2023-08-26

## 2023-08-28 ENCOUNTER — DOCUMENTATION ONLY (OUTPATIENT)
Dept: OTHER | Facility: CLINIC | Age: 68
End: 2023-08-28
Payer: COMMERCIAL

## 2023-08-29 ENCOUNTER — TRANSFERRED RECORDS (OUTPATIENT)
Dept: HEALTH INFORMATION MANAGEMENT | Facility: CLINIC | Age: 68
End: 2023-08-29
Payer: COMMERCIAL

## 2023-09-04 NOTE — PROGRESS NOTES
ASSESSMENT AND PLAN:     (R53.82) Chronic fatigue  (primary encounter diagnosis)  (R52) Body aches  Comment: Chronic, stable  Based on questionnaires completed, I don't believe that Celso would meet IOM criteria for a diagnosis of CFS, though, she may find that strategies to manage symptoms of that condition helpful, such as limiting activity when it clearly triggers severe fatigue.   I think her approach of taking a focused approach to individual symptom management, as well as engaging with therapists with expertise in mind-body connection is the best one moving forward.     (M51.26) Lumbar disc herniation  (M54.2,  G89.29) Chronic neck pain  Comment: Chronic, not at goal  Has met with [facility]. Considering L5 nerve root injection. Would like another opinion on their approach to her spine care before anything invasive - referral placed to spine center.   Plan: Spine  Referral          (R31.29) Microscopic hematuria  Comment: Chronic, stable.  Has shown up a couple of times on previous/outside labs, not always with a clear source. Last UA in July 2023 without blood. If negative this time, would repeat in 1 year and then not worry about it. If positive, would send to urology.   Plan: Routine UA with micro reflex to culture          (R79.81) Elevated CO2 level  Comment: Acute, uncomplicated.  Found incidentally on labs in July. Has snoring history and daytime fatigue - could be EMILI related. Has sleep appointment at [facility] for evaluation in 1 week. Update CO2 level today.   Plan: Basic metabolic panel          (D72.819) Leukopenia, unspecified type  Comment: Chronic, stable, mild leukopenia. Update labs today.   Plan: CBC with platelets differential         (R14.0) Abdominal bloating  Comment: Chronic, progressed.   Plan: Difficult to know how much of Celso's symptoms could be SIBO related and how many could be IBS. Current symptoms sound very much like IBS. I am hesitant to recommend a low FODMAP  "diet given her history of food restriction when she was initially dealing with her upper GI symptoms.    I recommended talking about these new lower GI symptoms with her gastroenterologist at [facility]. If they feel she would benefit from a low FODMAP diet, I recommended meet with our dietician Andreia Colon to implement this under supervision.     Follow up with me in 3 months for check in.       I spent a total of 48 minutes on the day of the visit.   Time spent by me doing chart review, history and exam, documentation and further activities per the note      Umberto Rendon MD   [facility]  09/05/2023, 11:02 AM      SUBJECTIVE:   Celso is a 67 year old female who presents to clinic today for a return visit.    Updates from last visit:  - has been having more body pains  - met with [facility] 8/15/23, discussed potential right L5 nerve root injection  - is planning to proceed with this  - is planning to proceed with steroid injection for right biceps tendon and starting PT (also with [facility]    - depression has lightened some  - hasn't had SI in 3 weeks or so  - has continued to taper off of mirtazapine, had been causing \"intense hunger pains\" and sensation of low blood sugars    - has been having more bloating and gassiness  - has been diagnosed with SIBO in the past, as well as visceral hypersensitivity   - started on rifamaxin and neomycin - caused a lot of diarrhea 3 days in so stopped  - bloating and gassiness come and go    - historically had regular morning BMs  - then developed abdominal cramping that would wake her in the morning and then trigger a bowel movement  - saw [facility], thought to be constipation related and started on miralax  - this seemed to help symptoms  - still takes miralax every night  - has a BM every morning, soft to watery  - the past 3-4 weeks, has had more frequent BMs during the day, will eat and then right after or up to an hour later will have gas " "and then a small narrow bowel movement  - gassiness does resolve temporarily after BM    Initial History from 23 visit:  - very active person prior to last year     - father  from sepsis during the pandemic (2022)  - soon thereafter starting getting severe abdominal pain, waking her up in the night  - develop back pain. Had an injury in yoga class that sent her to the emergency room  - after the ED visit, developed severe anxiety and feeling that she could not digest food  - stopped doing yoga due to fear of moving her body and having more pain     - as a result of all these symptoms, she ended up retiring earlier than planned  - also moved from [place] to [place] to be closer to [facility] and hope that medical care would be better  - realized after the fact that all these changes were \"like throwing logs on the fire\" and adding to her stressors.     - feels hypervigilant at times, anxiety ramps up, worries ramps up  - at other times feels fatigued  - sleep is not restful    Review of Systems from 23 visit:              Constitutional - had a lot of unintentional weight loss over the past year, is pretty much back to her normal weight now, no night sweats              Eyes - no vision concerns              Ears/Nose/Throat - no hearing concerns, no dysphagia              Cardiovascular - typically BP runs low, was high when anxiety started to get high, and has improved recently. When she has anxiety she will have a more rapid heart beat. Elevated heart rate is never the first thing to come on. Does not typically have rapid heart beat with position changes. Will sometimes have worse fatigue with going from lying to standing, but not presyncopal symptoms.               Pulmonary - no shortness of breath, wheezing, coughing              GI - on and off abdominal pains still, has a BM in the morning and then during the day 2-3 small Bms, takes 1 capful of miralax every night. No nausea/vomiting.    "             - some urine frequency/urgency, has had microscopic hematuria in the past,  no dysuria              Musculoskeletal - pain in lower back (bilateral), right hip radiating into right leg, right biceps (seeing [facility] next week for this), right neck and shoulder, pain in hands using computer and stiffness (especially MCPs and PIPs seeing hand therapy for this), right-hand dominant  Integument - scalp flaking and itching, no other skin changes, follows with outside dermatology              Neuro - intermittent numbness and tingling in right shin and a little in right toes (saw neurology for this in August 2022 - did MRI which showed lumbar nerve root compression)              Heme - bruises easily, no bleeding    Care Team  - Health psychologist at [facility] in [place] who specializes in the nervous system, polyvagal syndrome   - GI at [facility] (Leigh Toney) who specializes in functional GI disorders  - PT at [facility] in [place], specializes in neurologic disorders  - psychiatry Regan Quiles     Patient Active Problem List   Diagnosis     Third degree uterine prolapse     MARIELY (generalized anxiety disorder)     OAB (overactive bladder)     Osteopenia of multiple sites     Arthritis of carpometacarpal (CMC) joint of right thumb     Lumbar disc herniation     Depression     Chronic bilateral low back pain with right-sided sciatica     Chronic fatigue     Current Outpatient Medications   Medication     BUPROPION HCL PO     Calcium-Magnesium-Vitamin D (CALCIUM MAGNESIUM PO)     ESTRADIOL 0.1MG/GM CREAM     eszopiclone (LUNESTA) 1 MG tablet     fluocinonide (LIDEX) 0.05 % external solution     LORazepam (ATIVAN) 0.5 MG tablet     melatonin ER 3 MG tablet     mirtazapine (REMERON) 7.5 MG tablet     Polyethylene Glycol 3350 (MIRALAX PO)     No current facility-administered medications for this visit.       I have reviewed the patient's relevant  "past medical history.     OBJECTIVE:   /75 (BP Location: Left arm, Patient Position: Sitting, Cuff Size: Adult Regular)   Pulse 70   Temp 98.2  F (36.8  C) (Temporal)   LMP  (LMP Unknown)   SpO2 97%     Constitutional: well-appearing, appears stated age  Eyes: conjunctivae without erythema, sclera anicteric.   Skin: no rashes, lesions, or wounds  Psych: affect is full and appropriate, speech is fluent and non-pressured      23 [facility] Symptom Questionnaire  - only one symptom reported occurring at least \"about half the time\" and at least moderate severity, question #4 \"feeling unrefreshed after you wake up in the morning\"  - other symptoms of at least moderate severity (but less frequent occurrence) included:   1. Fatigue/extreme tiredness   2. Next day soreness or fatigue after non-strenuous, everyday activities   8. Difficulty paying attention for a long period of time    23 SF-36  Physical Function: 20%  Social Function: 33%  Mental Health: 40%  Pain: 44%  Change in Health: 75%  Role Limitation - Physical: 0%  Role Limitation - Mental: 33%  Energy/Vitality: 40%  Health Perception: 55%    IOM Dx Criteria:   1) Impairment in occupational, educational, social, or personal activities for >6 months, accompanied by fatigue, new onset, not alleviated by rest  2) Post-exertional malaise  3) Unrefreshing sleep  4) Either cognitive impairment OR objective orthostatic intolerance        [place] Sleepiness Scale  Each situation receives a score of zero to three, which is related to the likelihood that sleep will be induced:    0 = would never doze, 1 = slight chance of dozing, 2 = moderate chance, 3 = high chance  - Sitting and readin  - Watching television: 0  - Sitting inactively in a public place: 1  - Riding as a passenger in a car for one hour without a break: 1  - Lying down to rest in the afternoon when circumstances permit: 3  - Sitting and talking with someone: 0  - Sitting quietly after " lunch without alcohol: 0  - Sitting in a car as the , while stopped for a few minutes in traffic: 0    Total Score (>10 indicates excessive sleepiness): 5          9/13/2022     8:19 AM 4/3/2023     8:55 AM 9/5/2023     9:29 AM   PHQ   PHQ-9 Total Score 9 14 9   Q9: Thoughts of better off dead/self-harm past 2 weeks Not at all Several days Not at all         10/30/2022     9:32 PM 4/3/2023     8:55 AM 9/5/2023     9:29 AM   MARIELY-7 SCORE   Total Score 10 (moderate anxiety)     Total Score 10 11 10           Previous Evaluation  07/21/23 UA no protein, 0-2 RBCs, 0 WBCs  07/21/23 BMP normal other than CO2 30, LFTs normal  07/21/23 CK 63  07/21/23 WBC 3.6, ANC 2.1, Hgb 14.5, MCV 90, Platelets 237  07/21/23 ESR 5  07/21/23 B12 469, Vit D 44.7  07/21/23 Lyme screen negative  07/21/23 TSH 1.31     Four most recent WBCs prior to 2023 are 4.2 (01/2022), 4.0 (06/2021), 4.2 (10/2015), 8.2 (07/2015)     09/16/22 Endoscopic US: normal  03/03/22 CT abdomen/pelvis w/o contrast: small fat-containing abdominal wall hernia at umbilicus, otherwise normal  02/07/22 Colonoscopy: diverticulosis, tortuous colon, 4mm TA, otherwise normal

## 2023-09-05 ENCOUNTER — OFFICE VISIT (OUTPATIENT)
Dept: FAMILY MEDICINE | Facility: CLINIC | Age: 68
End: 2023-09-05
Payer: COMMERCIAL

## 2023-09-05 VITALS
TEMPERATURE: 98.2 F | OXYGEN SATURATION: 97 % | HEART RATE: 70 BPM | DIASTOLIC BLOOD PRESSURE: 75 MMHG | SYSTOLIC BLOOD PRESSURE: 114 MMHG

## 2023-09-05 DIAGNOSIS — M54.2 CHRONIC NECK PAIN: ICD-10-CM

## 2023-09-05 DIAGNOSIS — R53.82 CHRONIC FATIGUE: Primary | ICD-10-CM

## 2023-09-05 DIAGNOSIS — G89.29 CHRONIC NECK PAIN: ICD-10-CM

## 2023-09-05 DIAGNOSIS — R31.29 MICROSCOPIC HEMATURIA: ICD-10-CM

## 2023-09-05 DIAGNOSIS — M51.26 LUMBAR DISC HERNIATION: Chronic | ICD-10-CM

## 2023-09-05 DIAGNOSIS — R79.81 ELEVATED CO2 LEVEL: ICD-10-CM

## 2023-09-05 DIAGNOSIS — R52 BODY ACHES: ICD-10-CM

## 2023-09-05 DIAGNOSIS — D72.819 LEUKOPENIA, UNSPECIFIED TYPE: ICD-10-CM

## 2023-09-05 DIAGNOSIS — R14.0 ABDOMINAL BLOATING: ICD-10-CM

## 2023-09-05 LAB
ALBUMIN UR-MCNC: NEGATIVE MG/DL
ANION GAP SERPL CALCULATED.3IONS-SCNC: 10 MMOL/L (ref 7–15)
APPEARANCE UR: CLEAR
BACTERIA #/AREA URNS HPF: ABNORMAL /HPF
BASO+EOS+MONOS # BLD AUTO: 0.3 10E3/UL (ref 0–2.2)
BASO+EOS+MONOS NFR BLD AUTO: 7 %
BILIRUB UR QL STRIP: NEGATIVE
BUN SERPL-MCNC: 15 MG/DL (ref 8–23)
CALCIUM SERPL-MCNC: 9.5 MG/DL (ref 8.8–10.2)
CHLORIDE SERPL-SCNC: 103 MMOL/L (ref 98–107)
COLOR UR AUTO: ABNORMAL
CREAT SERPL-MCNC: 0.81 MG/DL (ref 0.51–0.95)
DEPRECATED HCO3 PLAS-SCNC: 27 MMOL/L (ref 22–29)
ERYTHROCYTE [DISTWIDTH] IN BLOOD BY AUTOMATED COUNT: 12.5 % (ref 10–15)
GFR SERPL CREATININE-BSD FRML MDRD: 80 ML/MIN/1.73M2
GLUCOSE SERPL-MCNC: 94 MG/DL (ref 70–99)
GLUCOSE UR STRIP-MCNC: NEGATIVE MG/DL
HCT VFR BLD AUTO: 43.1 % (ref 35–47)
HGB BLD-MCNC: 13.8 G/DL (ref 11.7–15.7)
HGB UR QL STRIP: NEGATIVE
KETONES UR STRIP-MCNC: NEGATIVE MG/DL
LEUKOCYTE ESTERASE UR QL STRIP: NEGATIVE
LYMPHOCYTES # BLD AUTO: 1.3 10E3/UL (ref 0.8–5.3)
LYMPHOCYTES NFR BLD AUTO: 26 %
MCH RBC QN AUTO: 29.3 PG (ref 26.5–33)
MCHC RBC AUTO-ENTMCNC: 32 G/DL (ref 31.5–36.5)
MCV RBC AUTO: 92 FL (ref 78–100)
MUCOUS THREADS #/AREA URNS LPF: PRESENT /LPF
NEUTROPHILS # BLD AUTO: 3.3 10E3/UL (ref 1.6–8.3)
NEUTROPHILS NFR BLD AUTO: 67 %
NITRATE UR QL: NEGATIVE
PH UR STRIP: 6 [PH] (ref 5–7)
PLATELET # BLD AUTO: 247 10E3/UL (ref 150–450)
POTASSIUM SERPL-SCNC: 4.2 MMOL/L (ref 3.4–5.3)
RBC # BLD AUTO: 4.71 10E6/UL (ref 3.8–5.2)
RBC URINE: 1 /HPF
SODIUM SERPL-SCNC: 140 MMOL/L (ref 136–145)
SP GR UR STRIP: 1.01 (ref 1–1.03)
UROBILINOGEN UR STRIP-MCNC: NORMAL MG/DL
WBC # BLD AUTO: 4.9 10E3/UL (ref 4–11)
WBC URINE: <1 /HPF

## 2023-09-05 ASSESSMENT — PATIENT HEALTH QUESTIONNAIRE - PHQ9
SUM OF ALL RESPONSES TO PHQ QUESTIONS 1-9: 9
5. POOR APPETITE OR OVEREATING: MORE THAN HALF THE DAYS

## 2023-09-05 ASSESSMENT — ANXIETY QUESTIONNAIRES
1. FEELING NERVOUS, ANXIOUS, OR ON EDGE: NEARLY EVERY DAY
3. WORRYING TOO MUCH ABOUT DIFFERENT THINGS: MORE THAN HALF THE DAYS
GAD7 TOTAL SCORE: 10
IF YOU CHECKED OFF ANY PROBLEMS ON THIS QUESTIONNAIRE, HOW DIFFICULT HAVE THESE PROBLEMS MADE IT FOR YOU TO DO YOUR WORK, TAKE CARE OF THINGS AT HOME, OR GET ALONG WITH OTHER PEOPLE: SOMEWHAT DIFFICULT
6. BECOMING EASILY ANNOYED OR IRRITABLE: NOT AT ALL
7. FEELING AFRAID AS IF SOMETHING AWFUL MIGHT HAPPEN: NOT AT ALL
5. BEING SO RESTLESS THAT IT IS HARD TO SIT STILL: NOT AT ALL
2. NOT BEING ABLE TO STOP OR CONTROL WORRYING: NEARLY EVERY DAY
GAD7 TOTAL SCORE: 10

## 2023-09-05 NOTE — NURSING NOTE
67 year old    Chief Complaint   Patient presents with    Follow Up     Needs help navigating treatment plan for conditions and problems. Hasn't had much change with fatigue/anxiety/depression since last appointment, and some pain in back and shoulder - has seen specialists at [facility] for this recently, they recommend cortisone shots in vertebrae              Blood pressure 114/75, pulse 70, temperature 98.2  F (36.8  C), temperature source Temporal, SpO2 97 %. There is no height or weight on file to calculate BMI.    Patient Active Problem List   Diagnosis    Third degree uterine prolapse    MARIELY (generalized anxiety disorder)    OAB (overactive bladder)    Osteopenia of multiple sites    Arthritis of carpometacarpal (CMC) joint of right thumb    Lumbar disc herniation    Depression    Chronic bilateral low back pain with right-sided sciatica    Chronic fatigue              Wt Readings from Last 2 Encounters:   08/08/23 53.1 kg (117 lb 1.3 oz)   04/03/23 50.3 kg (111 lb)       BP Readings from Last 3 Encounters:   09/05/23 114/75   08/08/23 121/85   04/03/23 105/71                Current Outpatient Medications   Medication    BUPROPION HCL PO    Calcium-Magnesium-Vitamin D (CALCIUM MAGNESIUM PO)    ESTRADIOL 0.1MG/GM CREAM    eszopiclone (LUNESTA) 1 MG tablet    fluocinonide (LIDEX) 0.05 % external solution    LORazepam (ATIVAN) 0.5 MG tablet    melatonin ER 3 MG tablet    mirtazapine (REMERON) 7.5 MG tablet    Polyethylene Glycol 3350 (MIRALAX PO)     No current facility-administered medications for this visit.              Social History     Tobacco Use    Smoking status: Never    Smokeless tobacco: Never   Substance Use Topics    Alcohol use: Not Currently    Drug use: No              Health Maintenance Due   Topic Date Due    DEPRESSION ACTION PLAN  Never done    Pneumococcal Vaccine: 65+ Years (2 - PCV) 12/22/2022    COVID-19 Vaccine (6 - Pfizer series) 02/28/2023    MEDICARE ANNUAL WELLNESS VISIT   06/27/2023    INFLUENZA VACCINE (1) 09/01/2023    PHQ-9  10/03/2023              Lab Results   Component Value Date    PAP NIL 09/26/2013 September 5, 2023 9:27 AM

## 2023-09-05 NOTE — PATIENT INSTRUCTIONS
1) Meet with [facility], talk about your more recent GI symptoms. Do they think more related to SIBO vs visceral hypersensitivity vs Irritable Bowel Syndrome.    2) If the gastroenterologist thinks that a low FODMAP diet might be helpful to try, I would schedule a visit with our dietician Andreia Colon     3) If the sleep doctor with [facility] does not offer you sleep apnea testing, please let me know    4) The spine center should call you to schedule. I recommend Dr. Sekou Knowles

## 2023-09-07 DIAGNOSIS — M51.26 LUMBAR DISC HERNIATION: Primary | Chronic | ICD-10-CM

## 2023-09-11 NOTE — TELEPHONE ENCOUNTER
DIAGNOSIS: Lumbar disc herniation [M51.26]  Chronic neck pain [M54.2, G89.29]    APPOINTMENT DATE: 9/21/23   NOTES STATUS DETAILS   OFFICE NOTE from referring provider Internal 9/5/23 OV Umberto Rendon MD    OFFICE NOTE from other specialist Care Everywhere [facility]  11/17/22 OV Anju Lomeli MD    [facility]   5/16/23 OV   Freddy Cristina MD   11/30/22 OV Caro Evangelista DPT        DISCHARGE REPORT from the ER Care Everywhere [facility] ED: 3/3/22  [facility]: 8/26/22   MEDICATION LIST Internal    [facility] (for Spine) Internal 1/4/23 Savage Mann MD    LABS & IMAGING      CBC/DIFF Internal 9/5/23   [facility]  Internal/PACS  MR Lumbar Spine: 8/15/22  XR Pelvis and right hip: 8/8/22  CT ABD Pelvis: 7/12/22  DEXA: 7/12/22

## 2023-09-21 ENCOUNTER — OFFICE VISIT (OUTPATIENT)
Dept: ORTHOPEDICS | Facility: CLINIC | Age: 68
End: 2023-09-21
Payer: COMMERCIAL

## 2023-09-21 ENCOUNTER — PRE VISIT (OUTPATIENT)
Dept: ORTHOPEDICS | Facility: CLINIC | Age: 68
End: 2023-09-21

## 2023-09-21 ENCOUNTER — ANCILLARY PROCEDURE (OUTPATIENT)
Dept: GENERAL RADIOLOGY | Facility: CLINIC | Age: 68
End: 2023-09-21
Attending: ORTHOPAEDIC SURGERY
Payer: COMMERCIAL

## 2023-09-21 VITALS — HEIGHT: 64 IN | WEIGHT: 118 LBS | BODY MASS INDEX: 20.14 KG/M2

## 2023-09-21 DIAGNOSIS — G89.29 CHRONIC NECK PAIN: ICD-10-CM

## 2023-09-21 DIAGNOSIS — M54.16 LUMBAR RADICULOPATHY: Primary | ICD-10-CM

## 2023-09-21 DIAGNOSIS — M54.2 CHRONIC NECK PAIN: ICD-10-CM

## 2023-09-21 DIAGNOSIS — M51.26 LUMBAR DISC HERNIATION: Chronic | ICD-10-CM

## 2023-09-21 PROCEDURE — 72082 X-RAY EXAM ENTIRE SPI 2/3 VW: CPT | Performed by: STUDENT IN AN ORGANIZED HEALTH CARE EDUCATION/TRAINING PROGRAM

## 2023-09-21 PROCEDURE — 77073 BONE LENGTH STUDIES: CPT | Performed by: STUDENT IN AN ORGANIZED HEALTH CARE EDUCATION/TRAINING PROGRAM

## 2023-09-21 PROCEDURE — 99203 OFFICE O/P NEW LOW 30 MIN: CPT | Performed by: ORTHOPAEDIC SURGERY

## 2023-09-21 NOTE — PROGRESS NOTES
In-Person Visit    REASON FOR CONSULTATION: Consult (New patient consult for lumbar herniation and cervical spine pain.  Patient reports right shoulder nerve pain, R. LE radicular pain/tightness, LBP.  Sx for ~2 years.  Active yoga participant.)     REFERRING PHYSICIAN: No ref. provider found  PCP:Umberto Rendon A    History of Present Illness:  67 year old female, referred by PCP Dr Rendon for evaluation of right lower extremity pain and low back pain.  The patient has had approximately 6 months of radiating right leg pain.  She reports that the pain travels down the lateral aspect of her proximal thigh and wraps around anteriorly.  The pain only occurs when walking for 15 to 20 minutes.  It improves with rest.  She also reports low back pain.  She has been participating in physical therapy and feels that the low back pain has improved.  She is seen to prior spine specialist in the past we have recommended nerve ablations and an L5-S1 injection.  Of note the patient did have some numbness over the lateral aspect of her leg, however this is improved with physical therapy and now she is left with the proximal thigh pain.    In addition the patient reports some radiating right arm pain.  She reports pain in the shoulder that wakes her up at night and will occasionally radiate distally just above the elbow on the lateral aspect of her arm.  She is currently seeing a shoulder specialist and is scheduled to receive a biceps tendon injection.  She denies any numbness.  She is currently in physical therapy for this problem.  No additional complaints or concerns reported at this time.    Back mainly back pain, improved.  Leg pain does worsen with ambulation on the right  Neck 10%, Arm 90%,  Right Only  Worse: Leg pain is worse with ambulation  Better: Back pain has improved with physical therapy    Previous treatment:   She has participated in dedicated physical therapy for both her shoulder and low back    Previous  Injections:  None    Oswestry (TIARA) Questionnaire        9/21/2023     8:38 AM   OSWESTRY DISABILITY INDEX   Count 5   Sum 12   Oswestry Score (%) 48 %      TIARA 9/21/23 48%      Neck Disability Index (NDI) Questionnaire        9/21/2023     8:46 AM   Neck Disability Index (NDI)   Neck Disability Index: Count 5   NDI: Total Score = SUM (points for all 10 findings) Incomplete   Neck Disability in Percent = (Total Score) / 50 * 100 Incomplete        Visual Analog Pain Scale  Back Pain Scale 0-10: 4  Right leg pain: 4  Left leg pain: 0  Neck Pain Scale 0-10: 6  Right arm pain: 6  Left arm pain: 0    PROMIS-10 Scores  Global Mental Health Score: (P) 8  Global Physical Health Score: (P) 9  PROMIS TOTAL - SUBSCORES: (P) 17    ROS:  A 12-point review of systems was completed and is negative except for otherwise noted above in the history of present illness.    Med Hx:  Past Medical History:   Diagnosis Date    Depression     MARIELY (generalized anxiety disorder)     Lumbar disc herniation     OAB (overactive bladder)     Osteopenia     Retinal tear of left eye     Small intestinal bacterial overgrowth (SIBO) 01/2023    Diagnosed by [facility], could not tolerate antibiotic treatment but symptoms improved    Squamous cell carcinoma of hand     mohs procedure    Third degree uterine prolapse     repair in 1/2014    Toe fracture 08/2022    right 2nd       Surg Hx:  Past Surgical History:   Procedure Laterality Date    Da Karen laparoscopic sacral colpopexy, supracervical hysterectomy, retropubic midurethral sling.  01/07/2014    LASIK      MOHS MICROGRAPHIC PROCEDURE  10/01/2013    left ankle, right ankle, left hand    RETINAL REATTACHMENT Left 2022       Allergies:  No Known Allergies    Meds:  Current Outpatient Medications   Medication    BUPROPION HCL PO    Calcium-Magnesium-Vitamin D (CALCIUM MAGNESIUM PO)    ESTRADIOL 0.1MG/GM CREAM    eszopiclone (LUNESTA) 1 MG tablet    fluocinonide (LIDEX) 0.05 % external solution    LORazepam  "(ATIVAN) 0.5 MG tablet    melatonin ER 3 MG tablet    mirtazapine (REMERON) 7.5 MG tablet    Polyethylene Glycol 3350 (MIRALAX PO)     No current facility-administered medications for this visit.       Fam Hx:  Family History   Problem Relation Age of Onset    Breast Cancer Mother         late 40's, negative BRCA    Uterine Cancer Mother     Hypertension Father     Glaucoma Father     Heart Failure Father     Osteoarthritis Sister     Atrial fibrillation Brother     Gout Brother     Neurologic Disorder Maternal Grandfather         We think MS, but not sure    Heart Disease Paternal Grandfather     Breast Cancer Maternal Aunt 70    Diabetes No family hx of     Macular Degeneration No family hx of     Ovarian Cancer No family hx of     Colon Cancer No family hx of     Autoimmune Disease No family hx of        P/S Hx:  Social History     Tobacco Use    Smoking status: Never    Smokeless tobacco: Never   Substance Use Topics    Alcohol use: Not Currently         Physical Exam:  Very pleasant, healthy appearing, alert, oriented x 3, cooperative.  Normal mood and affect.  Not in cardiorespiratory distress.  Ht 1.63 m (5' 4.17\")   Wt 53.5 kg (118 lb)   LMP  (LMP Unknown)   BMI 20.15 kg/m    Normal upright posture.    Normal gait without assistive device.  No antalgia / imbalance.    No gross spinal deformity, no skin lesions or surgical scars.  Localizes pain at in the low back, and right L2 nerve distribution  Tenderness: (-) midline, (-) paraspinal, (-) R and L PSIS.  ROM:   No significant deficits in range of motion of the back or neck    Neuro Exam:  Motor: 5/5 strength in all major muscle groups of the bilateral upper and lower extremities  Sensory: Sensation is intact to light touch in all major nerve distributions of the bilateral upper and lower extremities    Lower Extremity:  Equal leg lengths, full pulses, (-) atrophy / asymmetry.  Full painless passive knee and ankle motion.  Straight leg raise: (-) right, " (-) left.  Hip impingement: (-) right, (-) left.  MALIK/Shane's: (-) right, (-) left.  Negative Luz Marina's bilaterally  No clonus bilaterally    The patient has a positive champagne toast test, positive Filiberto's test, she has weakness with external rotation on the right.  She has a positive speeds test.  Negative Hornblower.  Negative bearhug.    Imaging:    EOS full spine: My personal interpretation: The patient has a partially lumbarized S1 vertebrae with the first nonrib-bearing vertebrae as L1.  There is no radiographic evidence of spondylolisthesis.  She does have some spondylosis at L5-S1.  The patient also has some C5-6 disc space collapse    MRI lumbar spine: 8/2022, my personal interpretation: There does appear to be a small right-sided L1-2 disc herniation that is impinging the traversing L2 nerve root.  No other compressive pathologies noted.  She does have some L5-6 disc space collapse    Reviewed abdominal CT from 7/12/22.  Shows partially lumbarized S1 (may be referred to as L6), still non-mobile, as there is solid bony bridge on the right side between the L6 transverse process and sacral ala.  On the left, there may be a pseudoarticulation; thus, Castellvi-Pandey type IV.    Assessment:    1.  Small extruded right posterolateral disc herniation L1-2.  2.  Chronic right proximal thigh pain probably 2' to right L2 radiculopathy.  3.  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  4.  Advanced spondylosis L5-6.  5.  Suspect right shoulder rotator cuff disease.    Plan:    We had a long discussion involving the differential diagnosis of both her right leg and right arm pains.  At this time we do feel that the patient may be having symptoms related to a small disc herniation at L1-2 on the right affecting the traversing L2 nerve root.  She has largely failed conservative nonoperative management in the form of physical therapy.  As such we do  feel that the patient is a reasonable candidate for a L2-3 transforaminal epidural spinal injection with the first nonrib-bearing vertebrae being labeled as L1.  We will then follow-up with the patient following this injection.  She will also have an MRI of the lumbar spine in the interim.  At her follow-up visit we would like to obtain full lumbar spine x-rays including flexion and extension views.    As for the patient's right shoulder we do feel that her current symptoms are more likely related to a primarily rotator cuff problem as she has provocative signs and symptoms on exam.  We would recommend that she continue with the biceps tendon injection that she has scheduled in addition participating to physical therapy.  If her shoulder pain does not improve would likely obtain an MRI of the cervical spine to evaluate for a possible C5-6 nerve impingement.    - L2-3 transforaminal epidural spinal injection, right (first nonrib-bearing vertebrae is L1)  - Rpt MRI lumbar spine; previous lumbar MRI was > 1 yr ago.  - Continue with shoulder physical therapy, and biceps tendon injection    RTC after above, with lumbar flexion-extension and pelvis outlet view x-rays.    Jeff Callejas MD    Attestation:  I (Dr. Sekou Knowles - Spine Surgeon) have personally evaluated patient with Spine Fellow Dr. Judah Callejas, and agree with findings and plan outlined in the note, which I also edited.  I discussed at length with the patient/family, explained the nature of spinal condition, and formulated workup and/or treatment plan together.  All questions were answered to the best of my ability and to patient's apparent satisfaction.     30 minutes spent on the date of the encounter doing chart review/review of outside records/review of test results/interpretation of tests/patient visit/documentation/discussion with other provider(s)/discussion with patient and family.    Sekou Knowles MD    Orthopaedic  Spine Surgery  Dept Orthopaedic Surgery, [facility]  [phone] office, [phone] pager  [email or web address]

## 2023-09-21 NOTE — LETTER
9/21/2023         RE: Celso Cates  [address]  [place] [place] [address]        Dear Colleague,    Thank you for referring your patient, Celso Cates, to the [facility] [place]. Please see a copy of my visit note below.    In-Person Visit    REASON FOR CONSULTATION: Consult (New patient consult for lumbar herniation and cervical spine pain.  Patient reports right shoulder nerve pain, R. LE radicular pain/tightness, LBP.  Sx for ~2 years.  Active yoga participant.)     REFERRING PHYSICIAN: No ref. provider found  PCP:Umberto Rendon A    History of Present Illness:  67 year old female, referred by PCP Dr Rendon for evaluation of right lower extremity pain and low back pain.  The patient has had approximately 6 months of radiating right leg pain.  She reports that the pain travels down the lateral aspect of her proximal thigh and wraps around anteriorly.  The pain only occurs when walking for 15 to 20 minutes.  It improves with rest.  She also reports low back pain.  She has been participating in physical therapy and feels that the low back pain has improved.  She is seen to prior spine specialist in the past we have recommended nerve ablations and an L5-S1 injection.  Of note the patient did have some numbness over the lateral aspect of her leg, however this is improved with physical therapy and now she is left with the proximal thigh pain.    In addition the patient reports some radiating right arm pain.  She reports pain in the shoulder that wakes her up at night and will occasionally radiate distally just above the elbow on the lateral aspect of her arm.  She is currently seeing a shoulder specialist and is scheduled to receive a biceps tendon injection.  She denies any numbness.  She is currently in physical therapy for this problem.  No additional complaints or concerns reported at this time.    Back mainly back pain, improved.  Leg pain does worsen with ambulation on the  right  Neck 10%, Arm 90%,  Right Only  Worse: Leg pain is worse with ambulation  Better: Back pain has improved with physical therapy    Previous treatment:   She has participated in dedicated physical therapy for both her shoulder and low back    Previous Injections:  None    Oswestry (TIARA) Questionnaire        9/21/2023     8:38 AM   OSWESTRY DISABILITY INDEX   Count 5   Sum 12   Oswestry Score (%) 48 %      TIARA 9/21/23 48%      Neck Disability Index (NDI) Questionnaire        9/21/2023     8:46 AM   Neck Disability Index (NDI)   Neck Disability Index: Count 5   NDI: Total Score = SUM (points for all 10 findings) Incomplete   Neck Disability in Percent = (Total Score) / 50 * 100 Incomplete        Visual Analog Pain Scale  Back Pain Scale 0-10: 4  Right leg pain: 4  Left leg pain: 0  Neck Pain Scale 0-10: 6  Right arm pain: 6  Left arm pain: 0    PROMIS-10 Scores  Global Mental Health Score: (P) 8  Global Physical Health Score: (P) 9  PROMIS TOTAL - SUBSCORES: (P) 17    ROS:  A 12-point review of systems was completed and is negative except for otherwise noted above in the history of present illness.    Med Hx:  Past Medical History:   Diagnosis Date    Depression     MARIELY (generalized anxiety disorder)     Lumbar disc herniation     OAB (overactive bladder)     Osteopenia     Retinal tear of left eye     Small intestinal bacterial overgrowth (SIBO) 01/2023    Diagnosed by [facility], could not tolerate antibiotic treatment but symptoms improved    Squamous cell carcinoma of hand     mohs procedure    Third degree uterine prolapse     repair in 1/2014    Toe fracture 08/2022    right 2nd       Surg Hx:  Past Surgical History:   Procedure Laterality Date    Da Karen laparoscopic sacral colpopexy, supracervical hysterectomy, retropubic midurethral sling.  01/07/2014    LASIK      MOHS MICROGRAPHIC PROCEDURE  10/01/2013    left ankle, right ankle, left hand    RETINAL REATTACHMENT Left 2022       Allergies:  No Known  "Allergies    Meds:  Current Outpatient Medications   Medication    BUPROPION HCL PO    Calcium-Magnesium-Vitamin D (CALCIUM MAGNESIUM PO)    ESTRADIOL 0.1MG/GM CREAM    eszopiclone (LUNESTA) 1 MG tablet    fluocinonide (LIDEX) 0.05 % external solution    LORazepam (ATIVAN) 0.5 MG tablet    melatonin ER 3 MG tablet    mirtazapine (REMERON) 7.5 MG tablet    Polyethylene Glycol 3350 (MIRALAX PO)     No current facility-administered medications for this visit.       Fam Hx:  Family History   Problem Relation Age of Onset    Breast Cancer Mother         late 40's, negative BRCA    Uterine Cancer Mother     Hypertension Father     Glaucoma Father     Heart Failure Father     Osteoarthritis Sister     Atrial fibrillation Brother     Gout Brother     Neurologic Disorder Maternal Grandfather         We think MS, but not sure    Heart Disease Paternal Grandfather     Breast Cancer Maternal Aunt 70    Diabetes No family hx of     Macular Degeneration No family hx of     Ovarian Cancer No family hx of     Colon Cancer No family hx of     Autoimmune Disease No family hx of        P/S Hx:  Social History     Tobacco Use    Smoking status: Never    Smokeless tobacco: Never   Substance Use Topics    Alcohol use: Not Currently         Physical Exam:  Very pleasant, healthy appearing, alert, oriented x 3, cooperative.  Normal mood and affect.  Not in cardiorespiratory distress.  Ht 1.63 m (5' 4.17\")   Wt 53.5 kg (118 lb)   LMP  (LMP Unknown)   BMI 20.15 kg/m    Normal upright posture.    Normal gait without assistive device.  No antalgia / imbalance.    No gross spinal deformity, no skin lesions or surgical scars.  Localizes pain at in the low back, and right L2 nerve distribution  Tenderness: (-) midline, (-) paraspinal, (-) R and L PSIS.  ROM:   No significant deficits in range of motion of the back or neck    Neuro Exam:  Motor: 5/5 strength in all major muscle groups of the bilateral upper and lower extremities  Sensory: " Sensation is intact to light touch in all major nerve distributions of the bilateral upper and lower extremities    Lower Extremity:  Equal leg lengths, full pulses, (-) atrophy / asymmetry.  Full painless passive knee and ankle motion.  Straight leg raise: (-) right, (-) left.  Hip impingement: (-) right, (-) left.  MALIK/Shane's: (-) right, (-) left.  Negative Luz Marina's bilaterally  No clonus bilaterally    The patient has a positive champagne toast test, positive Filiberto's test, she has weakness with external rotation on the right.  She has a positive speeds test.  Negative Hornblower.  Negative bearhug.    Imaging:    EOS full spine: My personal interpretation: The patient has a partially lumbarized S1 vertebrae with the first nonrib-bearing vertebrae as L1.  There is no radiographic evidence of spondylolisthesis.  She does have some spondylosis at L5-S1.  The patient also has some C5-6 disc space collapse    MRI lumbar spine: 8/2022, my personal interpretation: There does appear to be a small right-sided L1-2 disc herniation that is impinging the traversing L2 nerve root.  No other compressive pathologies noted.  She does have some L5-S1 disc base collapse    Reviewed abdominal CT from 7/12/22.  Shows partially lumbarized S1 (may be referred to as L6), still non-mobile, as there is solid bony bridge on the right side between the L6 transverse process and sacral ala.  On the left, there may be a pseudoarticulation; thus, Castellvi-Pandey type IV.    Assessment:    1.  Small extruded right posterolateral disc herniation L1-2.  2.  Chronic right proximal thigh pain probably 2' to right L2 radiculopathy.  3.  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  4.  Suspect right shoulder rotator cuff disease.    Plan:    We had a long discussion involving the differential diagnosis of both her right leg and right arm pains.  At this time we do feel that the  patient may be having symptoms related to a small disc herniation at L1-2 on the right affecting the traversing L2 nerve root.  She has largely failed conservative nonoperative management in the form of physical therapy.  As such we do feel that the patient is a reasonable candidate for a L2-3 transforaminal epidural spinal injection with the first nonrib-bearing vertebrae being labeled as L1.  We will then follow-up with the patient following this injection.  She will also have an MRI of the lumbar spine in the interim.  At her follow-up visit we would like to obtain full lumbar spine x-rays including flexion and extension views.    As for the patient's right shoulder we do feel that her current symptoms are more likely related to a primarily rotator cuff problem as she has provocative signs and symptoms on exam.  We would recommend that she continue with the biceps tendon injection that she has scheduled in addition participating to physical therapy.  If her shoulder pain does not improve would likely obtain an MRI of the cervical spine to evaluate for a possible C5-6 nerve impingement.    - L2-3 transforaminal epidural spinal injection, right (first nonrib-bearing vertebrae is L1)  - Rpt MRI lumbar spine; previous lumbar MRI was > 1 yr ago.  - Continue with shoulder physical therapy, and biceps tendon injection    RTC after above, with lumbar flexion-extension and pelvis outlet view x-rays.    Jeff Callejas MD    Attestation:  I (Dr. Sekou Knowles - Spine Surgeon) have personally evaluated patient with Spine Fellow Dr. Judah Callejas, and agree with findings and plan outlined in the note, which I also edited.  I discussed at length with the patient/family, explained the nature of spinal condition, and formulated workup and/or treatment plan together.  All questions were answered to the best of my ability and to patient's apparent satisfaction.     30 minutes spent on the date of the encounter doing chart  review/review of outside records/review of test results/interpretation of tests/patient visit/documentation/discussion with other provider(s)/discussion with patient and family.    Sekou Knowles MD    Orthopaedic Spine Surgery  Dept Orthopaedic Surgery, [facility]  [phone] office, [phone] pager  [email or web address]

## 2023-09-22 ENCOUNTER — TRANSFERRED RECORDS (OUTPATIENT)
Dept: HEALTH INFORMATION MANAGEMENT | Facility: CLINIC | Age: 68
End: 2023-09-22
Payer: COMMERCIAL

## 2023-09-27 ENCOUNTER — THERAPY VISIT (OUTPATIENT)
Dept: OCCUPATIONAL THERAPY | Facility: CLINIC | Age: 68
End: 2023-09-27
Payer: COMMERCIAL

## 2023-09-27 DIAGNOSIS — M79.641 PAIN IN BOTH HANDS: Primary | ICD-10-CM

## 2023-09-27 DIAGNOSIS — M79.642 PAIN IN BOTH HANDS: Primary | ICD-10-CM

## 2023-09-27 PROCEDURE — 97535 SELF CARE MNGMENT TRAINING: CPT | Mod: GO | Performed by: OCCUPATIONAL THERAPIST

## 2023-09-27 NOTE — PROGRESS NOTES
Hand Therapy Progress Note  Please refer to the daily flowsheet for treatment today, total treatment time and time spent performing 1:1 timed codes.       Current Date:  9/27/2023      Subjective       The L hand has been really flaring up and worse. It does seem like it is a bit better when not typing so much. R hand - nodule at base of MF. R thumb has been somewhat sore but not really a worry, it comes and goes.    Objective:  Pain Level (Scale 0-10)   7/6/2022 9/28/2022 6/7/2023     At Rest 0 0 0   With Use 5 Mild, but can vary Mild, can vary     Pain Description  Date 7/6/2022 6/7/2023 9/27/2023     Location Base of the thumb L hand. Dorsal central, around the MPj, into the fingers, into the IF in particular to the PIPj.  L Index finger, volar P1, also the MPj.  Feels like soft tissue more than the joint, although the MPj is somewhat tender today to the radial and dorsal side.   Pain Quality Sharp, Shooting and Tender.  Its nagging - tootie with typing     Frequency intermittent       Pain is worst  daytime     Exacerbated by  certain motions, , turn.  Stretch and press with the R thumb. Trying to use L thumb on spacebar Only happens with typing.   Hands will feel tired after chopping vegetables for a while, things will be achy.    Relieved by Rest. Tylenol seems to help a bit - does not take ibuprofen  Does use some lidocaine on the L hand, and the R arm and shoulder - it really helps.  Interested in using warm rice pack.   Progression  slowly improving possibly       Edema  none    Sensation   9/27/2023  No overt c/o paresthesias to the hands this date    AROM:   B wrists are WNL all planes    ROM  Thumb 7/6/2022 7/6/2022 9/28/2022 6/7/2023     AROM  (PROM) R L R bilaterally   MP /50 /55 40    IP /32 /45 46    RABD 35 26     PABD 35, pain ~10 sec after 40 41    Retropulsion       Kapandji Opposition Scale (0-10/10)    Doing well functionally with B thumbs       Palpation:   Tenderness / pain Report: -  none  + mild    ++ moderate    +++ severe      7/6/2022 7/6/2022 9/28/2022 6/7/2023 9/27/2023   Location Right Left  R    Radial thumb CMCj + -      Volar thumb CMC joint + - + tender     Thumb APB - mid muscle + -      Scaphoid tubercle - -      1st dorsal compartment - -      Extensor wad + at EDC/ECRB/L  Has been really painful to upper posterior quadrant, lateral arm, extensor wad Note that the R UE pain has improved, has a great PT.        Strength:  Pain-free /Pinch Test  Testing deferred    Assessment:  Pt has had a flare of pain int he L hand, which appears to be possibly soft tissue in nature, however the L MPj of the IF is somewhat tender. This date, discussed some potential management strategies.     Plan:  Frequency/Duration:  Recommend continuing to see patient  1 X a month, once daily  for 3 months (Pt is welcome to return as needed)  Appropriateness of Rx I have re-evaluated this patient and find that the nature, scope, duration and intensity of the therapy is appropriate for the medical condition of the patient.    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 Like the mushroom exercise in [employer]  Radial abduction finger walking.  Perform gentle intrinsic stretch: hook fist with some MPj ext, with palm down on leg, and rock from ulnar <>radial  Consider using ulnar side of hand/fist for massage to the front of the R pec tendon area.  Consider using home rice pack - heat to L hand, rest it over the hotpack  Possibly Voltaren  Tried KT strip to dorsal radial hand then wrapping tails under IF P1 (L): not good for the skin - became stuck and may have peeled up small patches/flecks of skin x2    Prior therapy:  Has a Right small ottobock orthosis, prefers neoprene wrap - wears sometimes.  Avoid  positions of thumb irritation  Provided with joint protection handout  Gave up on knitting   1st DA AROM  Had home ergo assessment  Jelly comb mouse. Uses apple KB - smaller KB works well

## 2023-11-10 ENCOUNTER — ALLIED HEALTH/NURSE VISIT (OUTPATIENT)
Dept: FAMILY MEDICINE | Facility: CLINIC | Age: 68
End: 2023-11-10
Payer: COMMERCIAL

## 2023-11-10 DIAGNOSIS — Z23 HIGH PRIORITY FOR 2019-NCOV VACCINE: Primary | ICD-10-CM

## 2023-11-10 NOTE — PROGRESS NOTES
Prior to immunization administration, verified patients identity using patient s name and date of birth. Please see Immunization Activity for additional information.     Screening Questionnaire for Adult Immunization    Are you sick today?   No   Do you have allergies to medications, food, a vaccine component or latex?   No   Have you ever had a serious reaction after receiving a vaccination?   No   Do you have a long-term health problem with heart, lung, kidney, or metabolic disease (e.g., diabetes), asthma, a blood disorder, no spleen, complement component deficiency, a cochlear implant, or a spinal fluid leak?  Are you on long-term aspirin therapy?   No   Do you have cancer, leukemia, HIV/AIDS, or any other immune system problem?   No   Do you have a parent, brother, or sister with an immune system problem?   No   In the past 3 months, have you taken medications that affect  your immune system, such as prednisone, other steroids, or anticancer drugs; drugs for the treatment of rheumatoid arthritis, Crohn s disease, or psoriasis; or have you had radiation treatments?   No   Have you had a seizure, or a brain or other nervous system problem?   No   During the past year, have you received a transfusion of blood or blood    products, or been given immune (gamma) globulin or antiviral drug?   No   For women: Are you pregnant or is there a chance you could become       pregnant during the next month?   No   Have you received any vaccinations in the past 4 weeks?   No     Immunization questionnaire answers were all negative.    I have reviewed the following standing orders:   This patient is due and qualifies for the Covid-19 vaccine.     Click here for COVID-19 Standing Order    I have reviewed the vaccines inclusion and exclusion criteria; No concerns regarding eligibility.     Patient instructed to remain in clinic for 15 minutes afterwards, and to report any adverse reactions.     Screening performed by Nany Yepez  JOSHUA on 11/10/2023 at 3:11 PM.    Celso Cates comes into clinic today at the request of Dr. Rendon Ordering Provider for Covid vaccine.      This service provided today was under the supervising provider of the day Dr. Santos, who was available if needed.    Nany Yepez, [facility]

## 2023-12-18 ENCOUNTER — TRANSFERRED RECORDS (OUTPATIENT)
Dept: HEALTH INFORMATION MANAGEMENT | Facility: CLINIC | Age: 68
End: 2023-12-18
Payer: COMMERCIAL

## 2024-01-22 ENCOUNTER — ANCILLARY PROCEDURE (OUTPATIENT)
Dept: MRI IMAGING | Facility: CLINIC | Age: 69
End: 2024-01-22
Attending: PHYSICAL MEDICINE & REHABILITATION
Payer: COMMERCIAL

## 2024-01-22 DIAGNOSIS — M54.2 NECK PAIN: ICD-10-CM

## 2024-01-22 PROCEDURE — 72141 MRI NECK SPINE W/O DYE: CPT | Performed by: RADIOLOGY

## 2024-01-23 DIAGNOSIS — N95.2 ATROPHIC VAGINITIS: ICD-10-CM

## 2024-01-23 NOTE — TELEPHONE ENCOUNTER
Medication requested: ESTRADIOL 0.1MG/GM CREAM   Last office visit: 9/5/23  [facility] appointments: none  Medication last refilled: 5/31/23; 1 g + 1 refill (ordered by Dr. Spring Fletcher)  Last qualifying labs: N/A    Routing refill request to provider for review/approval because:  Prescribed by another provider    Luis Armando HODGE, RN  01/23/24 10:13 AM

## 2024-01-26 PROBLEM — M79.642 PAIN IN BOTH HANDS: Status: RESOLVED | Noted: 2023-06-09 | Resolved: 2024-01-26

## 2024-01-26 PROBLEM — M79.641 PAIN IN BOTH HANDS: Status: RESOLVED | Noted: 2023-06-09 | Resolved: 2024-01-26

## 2024-02-06 ENCOUNTER — TRANSFERRED RECORDS (OUTPATIENT)
Dept: HEALTH INFORMATION MANAGEMENT | Facility: CLINIC | Age: 69
End: 2024-02-06
Payer: COMMERCIAL

## 2024-02-07 ENCOUNTER — TRANSFERRED RECORDS (OUTPATIENT)
Dept: HEALTH INFORMATION MANAGEMENT | Facility: CLINIC | Age: 69
End: 2024-02-07
Payer: COMMERCIAL

## 2024-06-10 ENCOUNTER — OFFICE VISIT (OUTPATIENT)
Dept: OPHTHALMOLOGY | Facility: CLINIC | Age: 69
End: 2024-06-10
Payer: COMMERCIAL

## 2024-06-10 DIAGNOSIS — H00.15 CHALAZION OF LEFT LOWER EYELID: Primary | ICD-10-CM

## 2024-06-10 PROCEDURE — 99213 OFFICE O/P EST LOW 20 MIN: CPT | Performed by: OPHTHALMOLOGY

## 2024-06-10 RX ORDER — NEOMYCIN POLYMYXIN B SULFATES AND DEXAMETHASONE 3.5; 10000; 1 MG/ML; [USP'U]/ML; MG/ML
SUSPENSION/ DROPS OPHTHALMIC
Qty: 5 ML | Refills: 0 | Status: SHIPPED | OUTPATIENT
Start: 2024-06-10

## 2024-06-10 ASSESSMENT — CONF VISUAL FIELD
OD_INFERIOR_TEMPORAL_RESTRICTION: 0
OD_NORMAL: 1
OD_INFERIOR_NASAL_RESTRICTION: 0
OD_SUPERIOR_TEMPORAL_RESTRICTION: 0
METHOD: COUNTING FINGERS
OS_NORMAL: 1
OS_SUPERIOR_NASAL_RESTRICTION: 0
OS_INFERIOR_NASAL_RESTRICTION: 0
OS_SUPERIOR_TEMPORAL_RESTRICTION: 0
OD_SUPERIOR_NASAL_RESTRICTION: 0
OS_INFERIOR_TEMPORAL_RESTRICTION: 0

## 2024-06-10 ASSESSMENT — VISUAL ACUITY
OD_SC: 20/25
OD_PH_SC: 20/20
OS_PH_SC+: -1
OS_SC: 20/25
OS_PH_SC: 20/20
METHOD: SNELLEN - LINEAR
OD_PH_SC+: -1

## 2024-06-10 ASSESSMENT — TONOMETRY
OS_IOP_MMHG: 15
IOP_METHOD: ICARE
OD_IOP_MMHG: 16

## 2024-06-10 NOTE — PROGRESS NOTES
Chief Complaints and History of Present Illnesses   Patient presents with    Stye / Hordeolum Evaluation     Chief Complaint(s) and History of Present Illness(es)     Stye / Hordeolum Evaluation    In left lower lid.           Comments    Last seen 3/27/23 for chalazion right lower lid.  That improved but now   having one on the left lower lid.  This has been present for 3 days.  Is   going on vacation this upcoming weekend and would like to get it taken   care of.  Some pain associated.  Some swelling of lower lid.  Some redness   inside the lid.  Has done warm compresses without benefit.      Lillian Morales on 6/10/2024 at 1:29 PM           Assessment & Plan     Celso Cates is a 68 year old female with the following diagnoses:   1. Chalazion of left lower eyelid      Left lower lid MGI opened with qtips  Warm soaks twice a day  Maxitrol three times a day x 1 week  Return to clinic as needed                  Attending Physician Attestation:  Complete documentation of historical and exam elements from today's encounter can be found in the full encounter summary report (not reduplicated in this progress note).  I personally obtained the chief complaint(s) and history of present illness.  I confirmed and edited as necessary the review of systems, past medical/surgical history, family history, social history, and examination findings as documented by others; and I examined the patient myself.  I personally reviewed the relevant tests, images, and reports as documented above.  I formulated and edited as necessary the assessment and plan and discussed the findings and management plan with the patient and family. I personally reviewed the ophthalmic test(s) associated with this encounter, agree with the interpretation(s) as documented by the resident/fellow, and have edited the corresponding report(s) as necessary.   -Duane Kearney MD  1:42 PM 6/10/2024

## 2024-06-10 NOTE — NURSING NOTE
Chief Complaints and History of Present Illnesses   Patient presents with    Stye / Hordeolum Evaluation     Chief Complaint(s) and History of Present Illness(es)       Stye / Hordeolum Evaluation              Laterality: left lower lid              Comments    Last seen 3/27/23 for chalazion right lower lid.  That improved but now having one on the left lower lid.  This has been present for 3 days.  Is going on vacation this upcoming weekend and would like to get it taken care of.  Some pain associated.  Some swelling of lower lid.  Some redness inside the lid.  Has done warm compresses without benefit.      Lillian Morales on 6/10/2024 at 1:29 PM

## 2024-06-10 NOTE — PATIENT INSTRUCTIONS
"Instructions for your chalazion / chalazia:  Most chalazia will resolve with treatment at home using warm compresses and massage to soften and drain them.  Follow these steps twice a day:     1.  Soak the eyelids for ten minutes with a hot wet cloth -- as hot as you can stand but not so hot that you burn yourself.  An easy way to make a long-lasting warm compress is to wrap a boiled egg or potato in a wet washcloth.  If you use the microwave to heat anything, be VERY CAREFUL that it is not too hot as microwaved foods and cloths can have very uneven hot spots that pose a burn hazard.       2.  After the eyelids are soft and refreshed from the hot compress, clean the debris from the glands at the bases of the eyelashes.  With a warm wet washcloth wrapped around your index finger, use the tip of your finger to vigorously scrub the bases of the eyelashes.  The principle is similar to brushing your teeth but here you can use a side-to-side motion.  Perform ten strokes per eyelid across the entire length of the eyelid. You can use plain water for this brushing but many patients claim better results if they use a dilute solution of one capful of Sai's Baby Shampoo in a glass of water.  This cleaning dislodges and removes the caked-in secretions in the gland and debris on the eyelids.  Do NOT wash the EYEBALL.     3.  If you have been prescribed an ointment, rub it on the eyelashes now.  Do NOT use Visine, Clear Eyes, or any \"anti-redness\" eye drops.  These can worsen your eye redness and irritation over time.     4.  Remember:  chalazia may take many WEEKS TO MONTHS to go away...so, hang in there and keep up with the compresses and scrubs!     5.  Diet & Supplements:  Modifying your diet helps reduce the chance of developing chalazia and possibly acne in some individuals.  This includes:  Avoid or decrease your intake of coffee, chocolate, refined sugars, and fried or processed foods. (Reduce gluten, breads, pastas, " and processed foods.)  Increase consumption of vegetables and fruits, fresh or lightly cooked. There is evidence  That Omega 3 supplementation will help as well. These are available at the local pharmacy or health food store. Dietary supplements with omega-3 fatty acids thin and decrease the inflammatory potential of the eyelid duct secretions decreasing your chance for recurrent chalazia in the future.  Omega-3 supplements are available from flax seeds, flax seed oil, or purified fish oil.  Supplement 500 - 1,500 mg of fish and/or flax seed oil daily for pre-adolescent children and 1,000 - 2,000 mg daily for adolescents and adults.  If you have any bleeding or cardiovascular problems or take prescription blood thinners, consult with your primary care physician before starting omega-3 supplements.      6.  Finally, if the chalazia persist despite following all the measures above consistently for at least 2 months, we can consider surgical removal.  In adults, this is performed as a 15 minute office procedure under local anesthesia. This necessitates general anesthesia in children, which we would much prefer to avoid if possible; so, again, please be diligent and patient with the above regimen.     7. If you have any questions please do not hesitate to contact us at [phone].

## 2024-07-10 ENCOUNTER — MEDICAL CORRESPONDENCE (OUTPATIENT)
Dept: HEALTH INFORMATION MANAGEMENT | Facility: CLINIC | Age: 69
End: 2024-07-10

## 2024-08-01 ENCOUNTER — OFFICE VISIT (OUTPATIENT)
Dept: OPHTHALMOLOGY | Facility: CLINIC | Age: 69
End: 2024-08-01
Payer: COMMERCIAL

## 2024-08-01 DIAGNOSIS — H02.889 MEIBOMIAN GLAND DYSFUNCTION: Primary | ICD-10-CM

## 2024-08-01 DIAGNOSIS — H00.15 CHALAZION OF LEFT LOWER EYELID: ICD-10-CM

## 2024-08-01 DIAGNOSIS — Z98.890 H/O LASER ASSISTED IN SITU KERATOMILEUSIS: ICD-10-CM

## 2024-08-01 DIAGNOSIS — H01.006 BLEPHARITIS OF BOTH EYES, UNSPECIFIED EYELID, UNSPECIFIED TYPE: ICD-10-CM

## 2024-08-01 DIAGNOSIS — H25.13 NUCLEAR AGE-RELATED CATARACT, BOTH EYES: ICD-10-CM

## 2024-08-01 DIAGNOSIS — Z98.890 HX OF LASER PHOTOCOAGULATION OF RETINA: ICD-10-CM

## 2024-08-01 DIAGNOSIS — H01.003 BLEPHARITIS OF BOTH EYES, UNSPECIFIED EYELID, UNSPECIFIED TYPE: ICD-10-CM

## 2024-08-01 PROCEDURE — 92014 COMPRE OPH EXAM EST PT 1/>: CPT | Performed by: OPTOMETRIST

## 2024-08-01 RX ORDER — NEOMYCIN SULFATE, POLYMYXIN B SULFATE, AND DEXAMETHASONE 3.5; 10000; 1 MG/G; [USP'U]/G; MG/G
0.5 OINTMENT OPHTHALMIC 2 TIMES DAILY PRN
Qty: 3.5 G | Refills: 1 | Status: SHIPPED | OUTPATIENT
Start: 2024-08-01

## 2024-08-01 ASSESSMENT — VISUAL ACUITY
METHOD: SNELLEN - LINEAR
OD_SC: 20/25
OS_SC: 20/20
OS_SC+: -3
OD_SC+: -3

## 2024-08-01 ASSESSMENT — TONOMETRY
OS_IOP_MMHG: 19
OD_IOP_MMHG: 17
IOP_METHOD: TONOPEN

## 2024-08-01 ASSESSMENT — CONF VISUAL FIELD
OD_SUPERIOR_NASAL_RESTRICTION: 0
OD_INFERIOR_NASAL_RESTRICTION: 0
METHOD: COUNTING FINGERS
OD_NORMAL: 1
OD_INFERIOR_TEMPORAL_RESTRICTION: 0
OD_SUPERIOR_TEMPORAL_RESTRICTION: 0
OS_NORMAL: 1
OS_SUPERIOR_TEMPORAL_RESTRICTION: 0
OS_INFERIOR_NASAL_RESTRICTION: 0
OS_INFERIOR_TEMPORAL_RESTRICTION: 0
OS_SUPERIOR_NASAL_RESTRICTION: 0

## 2024-08-01 ASSESSMENT — CUP TO DISC RATIO
OS_RATIO: 0.55
OD_RATIO: 0.55

## 2024-08-01 ASSESSMENT — EXTERNAL EXAM - RIGHT EYE: OD_EXAM: NORMAL

## 2024-08-01 ASSESSMENT — REFRACTION_MANIFEST
OD_ADD: +2.75
OD_SPHERE: -1.00
OD_CYLINDER: +1.00
OS_SPHERE: -0.50
OS_ADD: +2.75
OS_CYLINDER: SPHERE
OD_AXIS: 059

## 2024-08-01 ASSESSMENT — EXTERNAL EXAM - LEFT EYE: OS_EXAM: NORMAL

## 2024-08-01 NOTE — NURSING NOTE
Chief Complaints and History of Present Illnesses   Patient presents with    Annual Eye Exam     Here for annual eye exam     Chief Complaint(s) and History of Present Illness(es)       Annual Eye Exam              Associated symptoms: dryness.  Negative for eye pain, flashes and floaters    Treatments tried: artificial tears and warm compresses    Pain scale: 0/10    Comments: Here for annual eye exam              Comments    Pt states Distance VA is good without correction, readers for near.    She's been feeling dryness and 'sticky feeling' each eye especially at night.   Pt states chalazion LLL hasn't resolved yet.   Denies eye pain, flashes or floaters.     Ocular Meds:  AT Gel 1-2 times overnight each eye   Lid scrubs qPM each eye   Warm compress qPM each eye     Darshan Ho 8:44 AM August 1, 2024

## 2024-08-01 NOTE — PROGRESS NOTES
A/P  1.) MGD OU  -Persistent chalazion left eye, minimal improvement since plastics visit last month  -Already doing warm compress/lid scrub. 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 Per pt, left eye was significantly more myopic prior to LASIK  -Reviewed signs/symptoms of RD and need for stat eye eval should they occur    3.) H/o LASIK OU  -Mainly doing well without distance correction. Using computer PAL as needed  -Option for DVO single vision for nighttime driving if desire    Monitor 1-2 years comprehensive, sooner prn    I have confirmed the patient's CC, HPI and reviewed Past Medical History, Past Surgical History, Social History, Family History, Problem List, Medication List and agree with Tech note.     Maty Zepeda, [facility]

## 2024-08-08 DIAGNOSIS — N95.2 ATROPHIC VAGINITIS: ICD-10-CM

## 2024-08-09 NOTE — TELEPHONE ENCOUNTER
Med refilled during Dr. Davis's absence.     Celso was seen today for refill request.    Diagnoses and all orders for this visit:    Atrophic vaginitis  -     ESTRADIOL 0.1MG/GM CREAM; Place 1 g vaginally three times a week      Anshu Covarrubias MD  5:09 PM, August 9, 2024

## 2024-08-09 NOTE — TELEPHONE ENCOUNTER
Medication requested: ESTRADIOL 0.1MG/GM CREAM   Last office visit: 9/5/2023  [facility] appointments: none  Medication last refilled: 1/24/2024; 40 g + 1 refill  Last qualifying labs: n/a    Routing refill request to provider for review/approval because:  Drug not on the [facility] [place] protocol     NAVEEN Pond, RN  08/09/24, 4:46 PM

## 2024-08-10 ENCOUNTER — HEALTH MAINTENANCE LETTER (OUTPATIENT)
Age: 69
End: 2024-08-10

## 2024-09-20 ENCOUNTER — ANCILLARY PROCEDURE (OUTPATIENT)
Dept: MAMMOGRAPHY | Facility: CLINIC | Age: 69
End: 2024-09-20
Payer: COMMERCIAL

## 2024-09-20 ENCOUNTER — ANCILLARY PROCEDURE (OUTPATIENT)
Dept: BONE DENSITY | Facility: CLINIC | Age: 69
End: 2024-09-20
Payer: COMMERCIAL

## 2024-09-20 DIAGNOSIS — Z78.0 MENOPAUSE: ICD-10-CM

## 2024-09-20 DIAGNOSIS — Z12.31 VISIT FOR SCREENING MAMMOGRAM: ICD-10-CM

## 2024-09-20 PROCEDURE — 77063 BREAST TOMOSYNTHESIS BI: CPT | Performed by: RADIOLOGY

## 2024-09-20 PROCEDURE — 77080 DXA BONE DENSITY AXIAL: CPT

## 2024-09-20 PROCEDURE — 77067 SCR MAMMO BI INCL CAD: CPT | Performed by: RADIOLOGY

## 2024-10-19 ENCOUNTER — HEALTH MAINTENANCE LETTER (OUTPATIENT)
Age: 69
End: 2024-10-19

## 2024-11-06 ASSESSMENT — ANXIETY QUESTIONNAIRES: GAD7 TOTAL SCORE: 12

## 2024-12-02 ENCOUNTER — TRANSFERRED RECORDS (OUTPATIENT)
Dept: HEALTH INFORMATION MANAGEMENT | Facility: CLINIC | Age: 69
End: 2024-12-02
Payer: COMMERCIAL

## 2025-04-28 ENCOUNTER — OFFICE VISIT (OUTPATIENT)
Dept: FAMILY MEDICINE | Facility: CLINIC | Age: 70
End: 2025-04-28
Payer: COMMERCIAL

## 2025-04-28 VITALS
OXYGEN SATURATION: 99 % | TEMPERATURE: 98 F | SYSTOLIC BLOOD PRESSURE: 112 MMHG | HEART RATE: 69 BPM | DIASTOLIC BLOOD PRESSURE: 75 MMHG | WEIGHT: 127.08 LBS | BODY MASS INDEX: 21.7 KG/M2

## 2025-04-28 DIAGNOSIS — S79.921A THIGH INJURY, RIGHT, INITIAL ENCOUNTER: Primary | ICD-10-CM

## 2025-04-28 ASSESSMENT — ANXIETY QUESTIONNAIRES
6. BECOMING EASILY ANNOYED OR IRRITABLE: NOT AT ALL
3. WORRYING TOO MUCH ABOUT DIFFERENT THINGS: NOT AT ALL
1. FEELING NERVOUS, ANXIOUS, OR ON EDGE: NOT AT ALL
IF YOU CHECKED OFF ANY PROBLEMS ON THIS QUESTIONNAIRE, HOW DIFFICULT HAVE THESE PROBLEMS MADE IT FOR YOU TO DO YOUR WORK, TAKE CARE OF THINGS AT HOME, OR GET ALONG WITH OTHER PEOPLE: NOT DIFFICULT AT ALL
GAD7 TOTAL SCORE: 0
5. BEING SO RESTLESS THAT IT IS HARD TO SIT STILL: NOT AT ALL
2. NOT BEING ABLE TO STOP OR CONTROL WORRYING: NOT AT ALL
GAD7 TOTAL SCORE: 0
7. FEELING AFRAID AS IF SOMETHING AWFUL MIGHT HAPPEN: NOT AT ALL

## 2025-04-28 ASSESSMENT — PATIENT HEALTH QUESTIONNAIRE - PHQ9
SUM OF ALL RESPONSES TO PHQ QUESTIONS 1-9: 2
5. POOR APPETITE OR OVEREATING: NOT AT ALL
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
## 2025-04-28 NOTE — PATIENT INSTRUCTIONS
You have an impressive bruise - lots of soft tissue damage   - but no sign of any bone or joint issue  - keep active daily - walking great   - invest in a large heating pad.  20 min a twice a day until bruise is gone or MUCH better       If by the time you are back from [facility] it's still giving you any pain, then let me know for a referral to physical therapy.

## 2025-05-23 DIAGNOSIS — N95.2 ATROPHIC VAGINITIS: ICD-10-CM

## 2025-05-27 NOTE — TELEPHONE ENCOUNTER
Medication requested: ESTRADIOL 0.1MG/GM CREAM   Last office visit: 4/28/25  [facility] appointments: none  Medication last refilled: 8/9/24; 40 g + 1 refill  Last qualifying labs: N/A    Routing refill request to provider for review/approval because:  Drug not on the [facility] refill protocol     Luis Armando HODGE, RN  05/27/25 10:36 AM